# Patient Record
Sex: FEMALE | Race: WHITE | NOT HISPANIC OR LATINO | Employment: STUDENT | URBAN - METROPOLITAN AREA
[De-identification: names, ages, dates, MRNs, and addresses within clinical notes are randomized per-mention and may not be internally consistent; named-entity substitution may affect disease eponyms.]

---
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
## 2022-12-04 ENCOUNTER — HOSPITAL ENCOUNTER (EMERGENCY)
Facility: CLINIC | Age: 22
Discharge: HOME | End: 2022-12-04
Attending: EMERGENCY MEDICINE | Admitting: EMERGENCY MEDICINE
Payer: COMMERCIAL

## 2022-12-04 VITALS
BODY MASS INDEX: 21.63 KG/M2 | WEIGHT: 122.1 LBS | HEART RATE: 77 BPM | HEIGHT: 63 IN | TEMPERATURE: 98.4 F | RESPIRATION RATE: 12 BRPM | SYSTOLIC BLOOD PRESSURE: 101 MMHG | DIASTOLIC BLOOD PRESSURE: 60 MMHG | OXYGEN SATURATION: 100 %

## 2022-12-04 DIAGNOSIS — R10.33 PERIUMBILICAL ABDOMINAL PAIN: ICD-10-CM

## 2022-12-04 LAB
ALBUMIN SERPL-MCNC: 4.2 G/DL (ref 3.4–5)
ALBUMIN UR-MCNC: NEGATIVE MG/DL
ALP SERPL-CCNC: 71 U/L (ref 40–150)
ALT SERPL W P-5'-P-CCNC: 18 U/L (ref 0–50)
ANION GAP SERPL CALCULATED.3IONS-SCNC: 5 MMOL/L (ref 3–14)
APPEARANCE UR: CLEAR
AST SERPL W P-5'-P-CCNC: 14 U/L (ref 0–45)
BASOPHILS # BLD AUTO: 0 10E3/UL (ref 0–0.2)
BASOPHILS NFR BLD AUTO: 0 %
BILIRUB SERPL-MCNC: 0.6 MG/DL (ref 0.2–1.3)
BILIRUB UR QL STRIP: NEGATIVE
BUN SERPL-MCNC: 10 MG/DL (ref 7–30)
CALCIUM SERPL-MCNC: 9.5 MG/DL (ref 8.5–10.1)
CHLORIDE BLD-SCNC: 107 MMOL/L (ref 94–109)
CO2 SERPL-SCNC: 28 MMOL/L (ref 20–32)
COLOR UR AUTO: NORMAL
CREAT SERPL-MCNC: 0.63 MG/DL (ref 0.52–1.04)
EOSINOPHIL # BLD AUTO: 0.1 10E3/UL (ref 0–0.7)
EOSINOPHIL NFR BLD AUTO: 1 %
ERYTHROCYTE [DISTWIDTH] IN BLOOD BY AUTOMATED COUNT: 12 % (ref 10–15)
GFR SERPL CREATININE-BSD FRML MDRD: >90 ML/MIN/1.73M2
GLUCOSE BLD-MCNC: 86 MG/DL (ref 70–99)
GLUCOSE UR STRIP-MCNC: NEGATIVE MG/DL
HCG UR QL: NEGATIVE
HCT VFR BLD AUTO: 38.4 % (ref 35–47)
HGB BLD-MCNC: 13.5 G/DL (ref 11.7–15.7)
HGB UR QL STRIP: NEGATIVE
IMM GRANULOCYTES # BLD: 0 10E3/UL
IMM GRANULOCYTES NFR BLD: 0 %
KETONES UR STRIP-MCNC: NEGATIVE MG/DL
LACTATE SERPL-SCNC: 0.9 MMOL/L (ref 0.7–2)
LEUKOCYTE ESTERASE UR QL STRIP: NEGATIVE
LIPASE SERPL-CCNC: 207 U/L (ref 73–393)
LYMPHOCYTES # BLD AUTO: 4.2 10E3/UL (ref 0.8–5.3)
LYMPHOCYTES NFR BLD AUTO: 45 %
MCH RBC QN AUTO: 30.6 PG (ref 26.5–33)
MCHC RBC AUTO-ENTMCNC: 35.2 G/DL (ref 31.5–36.5)
MCV RBC AUTO: 87 FL (ref 78–100)
MONOCYTES # BLD AUTO: 0.6 10E3/UL (ref 0–1.3)
MONOCYTES NFR BLD AUTO: 7 %
NEUTROPHILS # BLD AUTO: 4.4 10E3/UL (ref 1.6–8.3)
NEUTROPHILS NFR BLD AUTO: 47 %
NITRATE UR QL: NEGATIVE
NRBC # BLD AUTO: 0 10E3/UL
NRBC BLD AUTO-RTO: 0 /100
PH UR STRIP: 5.5 [PH] (ref 5–7)
PLATELET # BLD AUTO: 392 10E3/UL (ref 150–450)
POTASSIUM BLD-SCNC: 3.5 MMOL/L (ref 3.4–5.3)
PROT SERPL-MCNC: 7.8 G/DL (ref 6.8–8.8)
RBC # BLD AUTO: 4.41 10E6/UL (ref 3.8–5.2)
SODIUM SERPL-SCNC: 140 MMOL/L (ref 133–144)
SP GR UR STRIP: 1.01 (ref 1–1.03)
UROBILINOGEN UR STRIP-MCNC: NORMAL MG/DL
WBC # BLD AUTO: 9.4 10E3/UL (ref 4–11)

## 2022-12-04 PROCEDURE — 85025 COMPLETE CBC W/AUTO DIFF WBC: CPT | Performed by: EMERGENCY MEDICINE

## 2022-12-04 PROCEDURE — 99284 EMERGENCY DEPT VISIT MOD MDM: CPT | Performed by: EMERGENCY MEDICINE

## 2022-12-04 PROCEDURE — 83605 ASSAY OF LACTIC ACID: CPT | Performed by: EMERGENCY MEDICINE

## 2022-12-04 PROCEDURE — 80053 COMPREHEN METABOLIC PANEL: CPT | Performed by: EMERGENCY MEDICINE

## 2022-12-04 PROCEDURE — 81025 URINE PREGNANCY TEST: CPT | Performed by: EMERGENCY MEDICINE

## 2022-12-04 PROCEDURE — 82040 ASSAY OF SERUM ALBUMIN: CPT | Performed by: EMERGENCY MEDICINE

## 2022-12-04 PROCEDURE — 258N000003 HC RX IP 258 OP 636: Performed by: EMERGENCY MEDICINE

## 2022-12-04 PROCEDURE — 36415 COLL VENOUS BLD VENIPUNCTURE: CPT | Performed by: EMERGENCY MEDICINE

## 2022-12-04 PROCEDURE — 83690 ASSAY OF LIPASE: CPT | Performed by: EMERGENCY MEDICINE

## 2022-12-04 PROCEDURE — 99283 EMERGENCY DEPT VISIT LOW MDM: CPT | Performed by: EMERGENCY MEDICINE

## 2022-12-04 PROCEDURE — 96360 HYDRATION IV INFUSION INIT: CPT | Performed by: EMERGENCY MEDICINE

## 2022-12-04 PROCEDURE — 81003 URINALYSIS AUTO W/O SCOPE: CPT | Performed by: EMERGENCY MEDICINE

## 2022-12-04 PROCEDURE — 96361 HYDRATE IV INFUSION ADD-ON: CPT | Performed by: EMERGENCY MEDICINE

## 2022-12-04 RX ORDER — ESCITALOPRAM OXALATE 10 MG/1
10 TABLET ORAL DAILY
COMMUNITY
Start: 2022-11-16

## 2022-12-04 RX ORDER — METHYLPHENIDATE HYDROCHLORIDE 18 MG/1
18 TABLET, EXTENDED RELEASE ORAL DAILY
COMMUNITY
Start: 2022-11-28

## 2022-12-04 RX ADMIN — SODIUM CHLORIDE 1000 ML: 9 INJECTION, SOLUTION INTRAVENOUS at 02:27

## 2022-12-04 ASSESSMENT — ACTIVITIES OF DAILY LIVING (ADL)
ADLS_ACUITY_SCORE: 33
ADLS_ACUITY_SCORE: 35
ADLS_ACUITY_SCORE: 35

## 2022-12-04 NOTE — DISCHARGE INSTRUCTIONS
Return to the emergency department for any worsening back pain, abdominal pain, fevers or chills, burning with urination, nausea or vomiting, weakness or any other concerns as given or discussed.

## 2022-12-04 NOTE — ED TRIAGE NOTES
Patient c/o sharp and stabbing pain around the navel. Onset 45 minutes ago. Reported intermittent nausea.      Triage Assessment     Row Name 12/04/22 0040       Triage Assessment (Adult)    Airway WDL WDL       Respiratory WDL    Respiratory WDL WDL       Skin Circulation/Temperature WDL    Skin Circulation/Temperature WDL WDL       Cardiac WDL    Cardiac WDL WDL       Peripheral/Neurovascular WDL    Peripheral Neurovascular WDL WDL       Cognitive/Neuro/Behavioral WDL    Cognitive/Neuro/Behavioral WDL WDL

## 2022-12-04 NOTE — ED PROVIDER NOTES
"    [facility] EMERGENCY DEPARTMENT ([facility])     December 4, 2022    History     Chief Complaint   Patient presents with     Abdominal Pain     HPI  Monse Castillo is a 22 year old female with a past medical history significant for depressive disorder who presents to the Emergency Department for evaluation of abdominal pain.  Patient developed periumbilical pain at approximately midnight.  Associated with one episode of diarrhea preceding the pain.  Had some mild nausea but no vomiting.  No associated fevers chills chest pain shortness of breath.    No dysuria or urgency, no vaginal discharge or bleeding  LMP middle of November  No trauma no falls no recent travel or unusual foods  No similar symptoms previously.  Google suggested appendicitis, which influenced patient decision to come to the ED    Past Medical History  Past Medical History:   Diagnosis Date     Depressive disorder      History reviewed. No pertinent surgical history.  CONCERTA 18 MG CR tablet  escitalopram (LEXAPRO) 10 MG tablet  vortioxetine (TRINTELLIX) 10 MG tablet      Allergies   Allergen Reactions     Grass Extracts [Gramineae Pollens]      Family History  History reviewed. No pertinent family history.  Social History   Social History     Tobacco Use     Smoking status: Never     Smokeless tobacco: Never   Substance Use Topics     Alcohol use: Never     Drug use: Never      Past medical history, past surgical history, medications, allergies, family history, and social history were reviewed with the patient. No additional pertinent items.       Review of Systems  A complete review of systems was performed with pertinent positives and negatives noted in the HPI, and all other systems negative.    Physical Exam   BP: 112/76  Pulse: 62  Temp: 98.4  F (36.9  C)  Resp: 16  Height: 160 cm (5' 3\")  Weight: 55.4 kg (122 lb 1.6 oz)  SpO2: 100 %  Physical Exam  GEN: Well appearing, non toxic, cooperative and conversant.   HEENT: The head is " normocephalic and atraumatic. Pupils are equal round and reactive to light. Extraocular motions are intact. There is no facial swelling. The neck is nontender and supple.   CV: Regular rate and rhythm without murmurs rubs or gallops. 2+ radial pulses bilaterally.  PULM: Clear to auscultation bilaterally.  ABD: Soft, mild diffuse tenderness to palpation though tolerates deep palpation, nondistended.   EXT: Full range of motion.  No edema.  NEURO: Cranial nerves II through XII are intact and symmetric. Bilateral upper and lower extremities grossly show full range of motion without any focal deficits.   PSYCH: Calm and cooperative, interactive.   ED Course      Procedures                     Results for orders placed or performed during the hospital encounter of 12/04/22   UA reflex to Microscopic and Culture     Status: Normal    Specimen: Urine, Clean Catch   Result Value Ref Range    Color Urine Straw Colorless, Straw, Light Yellow, Yellow    Appearance Urine Clear Clear    Glucose Urine Negative Negative mg/dL    Bilirubin Urine Negative Negative    Ketones Urine Negative Negative mg/dL    Specific Gravity Urine 1.010 1.003 - 1.035    Blood Urine Negative Negative    pH Urine 5.5 5.0 - 7.0    Protein Albumin Urine Negative Negative mg/dL    Urobilinogen Urine Normal Normal, 2.0 mg/dL    Nitrite Urine Negative Negative    Leukocyte Esterase Urine Negative Negative    Narrative    Microscopic not indicated   HCG qualitative urine     Status: Normal   Result Value Ref Range    hCG Urine Qualitative Negative Negative   Comprehensive metabolic panel     Status: Normal   Result Value Ref Range    Sodium 140 133 - 144 mmol/L    Potassium 3.5 3.4 - 5.3 mmol/L    Chloride 107 94 - 109 mmol/L    Carbon Dioxide (CO2) 28 20 - 32 mmol/L    Anion Gap 5 3 - 14 mmol/L    Urea Nitrogen 10 7 - 30 mg/dL    Creatinine 0.63 0.52 - 1.04 mg/dL    Calcium 9.5 8.5 - 10.1 mg/dL    Glucose 86 70 - 99 mg/dL    Alkaline Phosphatase 71 40 - 150  U/L    AST 14 0 - 45 U/L    ALT 18 0 - 50 U/L    Protein Total 7.8 6.8 - 8.8 g/dL    Albumin 4.2 3.4 - 5.0 g/dL    Bilirubin Total 0.6 0.2 - 1.3 mg/dL    GFR Estimate >90 >60 mL/min/1.73m2   Lactic acid whole blood     Status: Normal   Result Value Ref Range    Lactic Acid 0.9 0.7 - 2.0 mmol/L   Lipase     Status: Normal   Result Value Ref Range    Lipase 207 73 - 393 U/L   CBC with platelets and differential     Status: None   Result Value Ref Range    WBC Count 9.4 4.0 - 11.0 10e3/uL    RBC Count 4.41 3.80 - 5.20 10e6/uL    Hemoglobin 13.5 11.7 - 15.7 g/dL    Hematocrit 38.4 35.0 - 47.0 %    MCV 87 78 - 100 fL    MCH 30.6 26.5 - 33.0 pg    MCHC 35.2 31.5 - 36.5 g/dL    RDW 12.0 10.0 - 15.0 %    Platelet Count 392 150 - 450 10e3/uL    % Neutrophils 47 %    % Lymphocytes 45 %    % Monocytes 7 %    % Eosinophils 1 %    % Basophils 0 %    % Immature Granulocytes 0 %    NRBCs per 100 WBC 0 <1 /100    Absolute Neutrophils 4.4 1.6 - 8.3 10e3/uL    Absolute Lymphocytes 4.2 0.8 - 5.3 10e3/uL    Absolute Monocytes 0.6 0.0 - 1.3 10e3/uL    Absolute Eosinophils 0.1 0.0 - 0.7 10e3/uL    Absolute Basophils 0.0 0.0 - 0.2 10e3/uL    Absolute Immature Granulocytes 0.0 <=0.4 10e3/uL    Absolute NRBCs 0.0 10e3/uL   CBC with platelets differential     Status: None    Narrative    The following orders were created for panel order CBC with platelets differential.  Procedure                               Abnormality         Status                     ---------                               -----------         ------                     CBC with platelets and d...[[id number]]                      Final result                 Please view results for these tests on the individual orders.     Medications   0.9% sodium chloride BOLUS (0 mLs Intravenous Stopped 12/4/22 5352)        Assessments & Plan (with Medical Decision Making)   22-year-old female presenting with abdominal pain as described    DDx is broad including Pancreatitis, SBO,  appendicitis, colitis/enteritis bacterial or viral, ileitis, abscess formation, psoas abscess, ovarian disease including ovarian torsion and tubo-ovarian abscess, pyelonephritis, urinary tract infection, renal colic, , among other causes.    Clinically very well-appearing and nontoxic  Examination does show diffuse abdominal discomfort but is quite nonfocal and patient does tolerate deep palpation  Urinalysis without evidence of infection  hCG negative  Labs unrevealing    Patient received IV hydration as well as antiemetic resulting in resolution of her symptoms.  She was observed in the emergency department was able to sleep and her symptoms continued to be absent.  Well-appearing appropriate for discharge.    - Patient agrees to our plan and is ready and eager for discharge. Care plan, follow up plan, and reasons to return immediately to the ED were dicussed in detail and summarized as noted in the discharge instructions.      I have reviewed the nursing notes. I have reviewed the findings, diagnosis, plan and need for follow up with the patient.    New Prescriptions    No medications on file       Final diagnoses:   Periumbilical abdominal pain       --  Dileep Barnett MD  [facility] EMERGENCY DEPARTMENT  12/4/2022     Dileep Barnett MD  12/04/22 0516

## 2023-06-12 ENCOUNTER — TELEPHONE (OUTPATIENT)
Dept: PLASTIC SURGERY | Facility: CLINIC | Age: 23
End: 2023-06-12
Payer: COMMERCIAL

## 2023-06-12 NOTE — CONFIDENTIAL NOTE
[facility] :  Care Coordination Note     SITUATION   Monse Castillo (any pronouns) is a 23 year old adult who is receiving support for:  Care Team  .    BACKGROUND     Pt is scheduled for a gender affirming mastectomy consult with Dr. Mcgee on 10/5/23.     ASSESSMENT     Surgery              CGC Assessment  [facility] ([facility] [place] Enrollment: Enrolled  Patient has a therapist: Yes  Name of therapist: Brisa Sawant  Letter of support #1: Requested  Surgery being considered: Yes  Mastectomy: Yes    Pt reports:   No nicotine or other gender affirming surgeries      PLAN          Nursing Interventions:       Follow-up plan:  1. Obtain HALEIGH Almanza

## 2023-09-13 ENCOUNTER — TELEPHONE (OUTPATIENT)
Dept: PLASTIC SURGERY | Facility: CLINIC | Age: 23
End: 2023-09-13
Payer: COMMERCIAL

## 2023-09-13 NOTE — CONFIDENTIAL NOTE
Pt called to inquire how far they would book out if they needed to reschedule 10/5 appt with Dr. Mcgee. Writer stated that next available is Feb. 2023. Pt stated he will keep the appt for now.

## 2023-10-05 ENCOUNTER — OFFICE VISIT (OUTPATIENT)
Dept: PLASTIC SURGERY | Facility: AMBULATORY SURGERY CENTER | Age: 23
End: 2023-10-05
Payer: COMMERCIAL

## 2023-10-05 VITALS
WEIGHT: 129.1 LBS | BODY MASS INDEX: 22.88 KG/M2 | SYSTOLIC BLOOD PRESSURE: 108 MMHG | HEIGHT: 63 IN | DIASTOLIC BLOOD PRESSURE: 60 MMHG

## 2023-10-05 DIAGNOSIS — F64.9 GENDER DYSPHORIA: Primary | ICD-10-CM

## 2023-10-05 PROCEDURE — 99203 OFFICE O/P NEW LOW 30 MIN: CPT | Performed by: PLASTIC SURGERY

## 2023-10-05 NOTE — PROGRESS NOTES
"October 5, 2023    Chief complaint:  Gender dysphoria, consultation for top surgery     History of present illness:  This is a 23 year old patient who comes today for consultation regarding top surgery.  Pronouns they/them.  Patient's name is Monse.  Patient is not taking testosterone.  They have been dealing with gender dysphoria for the last 7 years.  Patient wears binders.  Letter of support is pending.     Past medical history:  Past Medical History:   Diagnosis Date    Depressive disorder      Gender dysphoria     Past surgical history:  Denies     Allergies:  No known drug allergies    Medications:    Current Outpatient Medications:     CONCERTA 18 MG CR tablet, Take 18 mg by mouth daily, Disp: , Rfl:     escitalopram (LEXAPRO) 10 MG tablet, Take 10 mg by mouth daily, Disp: , Rfl:     vortioxetine (TRINTELLIX) 10 MG tablet, Take 15 mg by mouth daily, Disp: , Rfl:     Family history:  Noncontributory     Social History:  Denies tobacco, drinks alcohol socially     Review of systems:  General ROS: No complaints or constitutional symptoms  Skin: No complaints or symptoms   Hematologic/Lymphatic: No symptoms or complaints  Psychiatric: Patient presents with gender dysphoria  Endocrine: No excessive fatigue, no hypermetabolic symptoms reported  Respiratory ROS: No cough, shortness of breath, or wheezing  Cardiovascular ROS: No chest pain or dyspnea on exertion  Breast ROS: Denies nipple discharge, denies palpable breast masses, denies peau d'orange.  Gastrointestinal ROS: No abdominal pain, nausea, diarrhea, or constipation  Musculoskeletal ROS: No recent injuries reported  Neurological ROS: No focal neurologic defects reported.       Physical exam:    /60   Ht 1.6 m (5' 3\")   Wt 58.6 kg (129 lb 1.6 oz)   BMI 22.87 kg/m    General: Alert, cooperative, appears stated age   Skin: Skin color, texture, turgor normal, no rashes or lesions   Lymphatic: No obvious adenopathy, no swelling   Eyes: No scleral " "icterus, pupils equal  HENT: No traumatic injury to the head or face, no gross abnormalities  Lungs: Normal respiratory effort, breath sounds equal bilaterally  Heart: Regular rate and rhythm  Breasts: Bilateral grade 2 ptosis.  No nipple inversion, no obvious nipple discharge, no obvious palpable breast masses, no peau d'orange.  No palpable axillary lymphadenopathy bilaterally.  Abdomen: Soft, non-distended and non-tender to palpation  Neurologic: Grossly intact           Assessment:     23 year old patient history of gender dysphoria.     PLAN:      I believe that Monse is a good candidate for gender affirming top surgery with one of the following options: Bilateral simple mastectomies versus bilateral breast reduction with possible nipple graft reconstruction. Patient desires NO nipple reconstruction.     \"According to [place] case law and [facility] standards of care, with an appropriate letter of support from a mental health provider, top surgery/mastectomy is medically necessary for the treatment of gender dysphoria.\"     I discussed with Monse the risks of surgery and they include but are not limited to scarring, keloid formation, infection, bleeding, hematoma, seroma, contour deformities, lack of sensation on the chest, the fact that no nipple graft will be offered, need for further surgeries.     Patient did acknowledge all of these risks and has agreed to proceed.     We will obtain a letter of support from their therapist and then we will schedule for surgery: bilateral simple mastectomies with NO nipple grafting.     Time spent with the patient 30 minutes.    Nehemiah Mcgee MD , FACS   Diplomate American Board of Plastic Surgery  Diplomate American Board of Surgery  Adj. Assistant Professor of Surgery  Division of Plastic & Reconstructive Surgery   [facility]  Office: [phone]   10/5/2023 at 2:13 PM   "

## 2023-10-05 NOTE — LETTER
10/5/2023         RE: Monse Castillo  [address]  [place] [address]        Dear Colleague,    Thank you for referring your patient, Monse Castillo, to the [facility] [place]. Please see a copy of my visit note below.    October 5, 2023    Chief complaint:  Gender dysphoria, consultation for top surgery     History of present illness:  This is a 23 year old patient who comes today for consultation regarding top surgery.  Pronouns they/them.  Patient's name is Monse.  Patient is not taking testosterone.  They have been dealing with gender dysphoria for the last 7 years.  Patient wears binders.  Letter of support is pending.     Past medical history:  Past Medical History:   Diagnosis Date     Depressive disorder      Gender dysphoria     Past surgical history:  Denies     Allergies:  No known drug allergies    Medications:    Current Outpatient Medications:      CONCERTA 18 MG CR tablet, Take 18 mg by mouth daily, Disp: , Rfl:      escitalopram (LEXAPRO) 10 MG tablet, Take 10 mg by mouth daily, Disp: , Rfl:      vortioxetine (TRINTELLIX) 10 MG tablet, Take 15 mg by mouth daily, Disp: , Rfl:     Family history:  Noncontributory     Social History:  Denies tobacco, drinks alcohol socially     Review of systems:  General ROS: No complaints or constitutional symptoms  Skin: No complaints or symptoms   Hematologic/Lymphatic: No symptoms or complaints  Psychiatric: Patient presents with gender dysphoria  Endocrine: No excessive fatigue, no hypermetabolic symptoms reported  Respiratory ROS: No cough, shortness of breath, or wheezing  Cardiovascular ROS: No chest pain or dyspnea on exertion  Breast ROS: Denies nipple discharge, denies palpable breast masses, denies peau d'orange.  Gastrointestinal ROS: No abdominal pain, nausea, diarrhea, or constipation  Musculoskeletal ROS: No recent injuries reported  Neurological ROS: No focal neurologic defects reported.      "  Physical exam:    /60   Ht 1.6 m (5' 3\")   Wt 58.6 kg (129 lb 1.6 oz)   BMI 22.87 kg/m    General: Alert, cooperative, appears stated age   Skin: Skin color, texture, turgor normal, no rashes or lesions   Lymphatic: No obvious adenopathy, no swelling   Eyes: No scleral icterus, pupils equal  HENT: No traumatic injury to the head or face, no gross abnormalities  Lungs: Normal respiratory effort, breath sounds equal bilaterally  Heart: Regular rate and rhythm  Breasts: Bilateral grade 2 ptosis.  No nipple inversion, no obvious nipple discharge, no obvious palpable breast masses, no peau d'orange.  No palpable axillary lymphadenopathy bilaterally.  Abdomen: Soft, non-distended and non-tender to palpation  Neurologic: Grossly intact           Assessment:     23 year old patient history of gender dysphoria.     PLAN:      I believe that Monse is a good candidate for gender affirming top surgery with one of the following options: Bilateral simple mastectomies versus bilateral breast reduction with possible nipple graft reconstruction. Patient desires NO nipple reconstruction.     \"According to [place] case law and [facility] standards of care, with an appropriate letter of support from a mental health provider, top surgery/mastectomy is medically necessary for the treatment of gender dysphoria.\"     I discussed with Monse the risks of surgery and they include but are not limited to scarring, keloid formation, infection, bleeding, hematoma, seroma, contour deformities, lack of sensation on the chest, the fact that no nipple graft will be offered, need for further surgeries.     Patient did acknowledge all of these risks and has agreed to proceed.     We will obtain a letter of support from their therapist and then we will schedule for surgery: bilateral simple mastectomies with NO nipple grafting.     Time spent with the patient 30 minutes.    Nehemiah Mcgee MD , FACS   Diplomate American Board of Plastic " Surgery  Diplomate American Board of Surgery  Adj. Assistant Professor of Surgery  Division of Plastic & Reconstructive Surgery   [facility]  Office: [phone]   10/5/2023 at 2:13 PM       Again, thank you for allowing me to participate in the care of your patient.        Sincerely,        Nehemiah Mcgee MD

## 2023-10-05 NOTE — NURSING NOTE
Patient here today for top surgery consult. The patient does not have a LOS but is working to get one. I have encouraged the patient to upload the LOS via [facility] once they have one so we can move forward with scheduling.     Claribel Gomez RN on 10/5/2023 at 3:00 PM

## 2023-10-22 ENCOUNTER — HEALTH MAINTENANCE LETTER (OUTPATIENT)
Age: 23
End: 2023-10-22

## 2023-11-17 ENCOUNTER — OFFICE VISIT (OUTPATIENT)
Dept: URGENT CARE | Facility: URGENT CARE | Age: 23
End: 2023-11-17
Payer: COMMERCIAL

## 2023-11-17 VITALS
HEART RATE: 86 BPM | SYSTOLIC BLOOD PRESSURE: 123 MMHG | HEIGHT: 64 IN | OXYGEN SATURATION: 98 % | WEIGHT: 130 LBS | BODY MASS INDEX: 22.2 KG/M2 | TEMPERATURE: 98.4 F | DIASTOLIC BLOOD PRESSURE: 76 MMHG

## 2023-11-17 DIAGNOSIS — K64.4 EXTERNAL HEMORRHOIDS: ICD-10-CM

## 2023-11-17 DIAGNOSIS — K62.5 PAINLESS RECTAL BLEEDING: Primary | ICD-10-CM

## 2023-11-17 PROBLEM — F41.9 ANXIETY: Status: ACTIVE | Noted: 2017-02-02

## 2023-11-17 LAB
ERYTHROCYTE [DISTWIDTH] IN BLOOD BY AUTOMATED COUNT: 12.2 % (ref 10–15)
HCT VFR BLD AUTO: 37.9 % (ref 35–53)
HEMOCCULT STL QL: NEGATIVE
HGB BLD-MCNC: 13 G/DL (ref 11.7–17.7)
MCH RBC QN AUTO: 30.4 PG (ref 26.5–33)
MCHC RBC AUTO-ENTMCNC: 34.3 G/DL (ref 31.5–36.5)
MCV RBC AUTO: 89 FL (ref 78–100)
PLATELET # BLD AUTO: 400 10E3/UL (ref 150–450)
RBC # BLD AUTO: 4.28 10E6/UL (ref 3.8–5.9)
WBC # BLD AUTO: 5.4 10E3/UL (ref 4–11)

## 2023-11-17 PROCEDURE — 82272 OCCULT BLD FECES 1-3 TESTS: CPT | Performed by: STUDENT IN AN ORGANIZED HEALTH CARE EDUCATION/TRAINING PROGRAM

## 2023-11-17 PROCEDURE — 99203 OFFICE O/P NEW LOW 30 MIN: CPT | Performed by: STUDENT IN AN ORGANIZED HEALTH CARE EDUCATION/TRAINING PROGRAM

## 2023-11-17 PROCEDURE — 85027 COMPLETE CBC AUTOMATED: CPT | Performed by: STUDENT IN AN ORGANIZED HEALTH CARE EDUCATION/TRAINING PROGRAM

## 2023-11-17 PROCEDURE — 36415 COLL VENOUS BLD VENIPUNCTURE: CPT | Performed by: STUDENT IN AN ORGANIZED HEALTH CARE EDUCATION/TRAINING PROGRAM

## 2023-11-17 RX ORDER — HYDROCORTISONE 25 MG/G
CREAM TOPICAL 2 TIMES DAILY PRN
Qty: 28 G | Refills: 0 | Status: SHIPPED | OUTPATIENT
Start: 2023-11-17 | End: 2023-11-24

## 2023-11-17 NOTE — PROGRESS NOTES
Assessment & Plan     Painless rectal bleeding  External hemorrhoids  Exam consistent with nonthrombosed external hemorrhoids.  Hemoccult negative reassuring against internal GI bleed.  Family history of Crohn's disease however Monse does not have abdominal pain, diarrhea, recent weight loss.  No signs of hypovolemia and HgB stable at 13.0 compared to last labs. Discussed symptom management with Monse including Anusol twice daily for 7 days, dietary changes, symptom management.  They were understanding and in agreement of the plan at the time of discharge.  - Follow-up with PCP if no improvement of symptoms in 2 weeks  - Present to emergency department if any lightheadedness or dizziness concerning for acute blood loss  - Occult blood stool  - CBC with platelets  - hydrocortisone, Perianal, (HYDROCORTISONE) 2.5 % cream  Dispense: 28 g; Refill: 0    Return for In clinic with your primary care provider.    Charmaine Negrete, DO  she/her  [facility]    Subjective     Monse Castillo is a 23 year old adult who presents to clinic today for the following health issues:    HPI  12 days ago, wiped and there was blood on the toilet paper/in the toilet bowl.   Since then, have had blood with every BM. Two night ago, more blood with a clump  Bright red blood, painless  No abdominal pain, cramping, fever, vomiting, hematemesis  Passing gas normally   Normal BM: daily, reg 4 on bristol   Not vaginal, currently menstruating  No recent dietary changes- some loss of appetite,   No medication changes in the last year  No anal penetration  Crohn's disease runs in family (grandfather's side of family)  No h/o internal or external hemorrhoids    Past Medical History:   Diagnosis Date    Depressive disorder      Allergies   Allergen Reactions    Grass Extracts [Gramineae Pollens]      Current Outpatient Medications   Medication    CONCERTA 18 MG CR tablet    escitalopram (LEXAPRO) 10 MG tablet    hydrocortisone,  "Perianal, (HYDROCORTISONE) 2.5 % cream    vortioxetine (TRINTELLIX) 10 MG tablet     No current facility-administered medications for this visit.      Review of Systems  Constitutional, HEENT, cardiovascular, pulmonary, gi and gu systems are negative, except as otherwise noted.      Objective    /76   Pulse 86   Temp 98.4  F (36.9  C) (Oral)   Ht 1.613 m (5' 3.5\")   Wt 59 kg (130 lb)   LMP 11/01/2023   SpO2 98%   BMI 22.67 kg/m      Physical Exam   GENERAL: healthy, alert and no distress   (female): normal female external genitalia  RECTAL (female): normal sphincter tone, no rectal masses and two small external non-thrombosed hemorrhoid    Results for orders placed or performed in visit on 11/17/23   Occult blood stool     Status: Normal   Result Value Ref Range    Occult Blood Negative Negative           The use of Dragon/Cognotion dictation services may have been used to construct the content in this note; any grammatical or spelling errors are non-intentional. Please contact the author of this note directly if you are in need of any clarification.  "

## 2023-11-17 NOTE — PATIENT INSTRUCTIONS
If you notice any lightheadedness, dizziness that does not resolve with rest or oral intake, please present to ED    If symptoms do not improve with lifestyle changes and medication, please follow up with PCP    I recommend a high-fiber diet, increase fluids, limiting caffeine and alcohol.  The less you can strain with BMs the better.  You can try witch hazel pads or sitz baths twice daily.  I am giving you Anusol ointment to use twice daily for 7 days.

## 2024-02-02 ENCOUNTER — DOCUMENTATION ONLY (OUTPATIENT)
Dept: PLASTIC SURGERY | Facility: CLINIC | Age: 24
End: 2024-02-02
Payer: COMMERCIAL

## 2024-02-02 NOTE — PROGRESS NOTES
[facility] :  Care Coordination Note     SITUATION   Monse Castillo  is a 23 year old adult who is receiving support for:  Care Team  .    BACKGROUND     LOS received and may need updates. Pt insurance is not on file. Sent message to pt to confirm current insurance.    3/20/24  Updated LOS reviewed and meets [employer] plus policy. Updated pt list. Sent message to pt with update and requested pt upload front and back of new insurance card. Sent message to RNCC to request CR.    ASSESSMENT     Surgery              CGC Assessment  [facility] ([facility]) Enrollment: Enrolled  Patient has a therapist: Yes  Name of therapist: Brisa Sawant  Letter of support #1: Received  Letter #1 Date: 02/18/24  Surgery being considered: Yes  Mastectomy: Yes      PLAN          Nursing Interventions:       Follow-up plan:  Pt will upload insurance info. Surgeon will place CR.       FREDA Bravo

## 2024-03-21 ENCOUNTER — TELEPHONE (OUTPATIENT)
Dept: PLASTIC SURGERY | Facility: AMBULATORY SURGERY CENTER | Age: 24
End: 2024-03-21
Payer: COMMERCIAL

## 2024-03-21 PROBLEM — F64.9 GENDER DYSPHORIA: Status: ACTIVE | Noted: 2023-10-05

## 2024-05-13 ENCOUNTER — ANESTHESIA EVENT (OUTPATIENT)
Dept: SURGERY | Facility: AMBULATORY SURGERY CENTER | Age: 24
End: 2024-05-13
Payer: COMMERCIAL

## 2024-05-13 NOTE — ANESTHESIA PREPROCEDURE EVALUATION
"Anesthesia Pre-Procedure Evaluation    Patient: Monse Castillo   MRN: [id number] : 2000        Procedure : Procedure(s):  MASTECTOMY, SIMPLE BILATERAL          Past Medical History:   Diagnosis Date    Depressive disorder       No past surgical history on file.   Allergies   Allergen Reactions    Grass Extracts [Gramineae Pollens]       Social History     Tobacco Use    Smoking status: Never    Smokeless tobacco: Never   Substance Use Topics    Alcohol use: Never      Wt Readings from Last 1 Encounters:   23 59 kg (130 lb)           Physical Exam    Airway        Mallampati: I   TM distance: > 3 FB   Neck ROM: full   Mouth opening: > 3 cm    Respiratory Devices and Support         Dental       (+) Minor Abnormalities - some fillings, tiny chips      Cardiovascular   cardiovascular exam normal          Pulmonary   pulmonary exam normal                OUTSIDE LABS:  CBC:   Lab Results   Component Value Date    WBC 5.4 2023    WBC 9.4 2022    HGB 13.0 2023    HGB 13.5 2022    HCT 37.9 2023    HCT 38.4 2022     2023     2022     BMP:   Lab Results   Component Value Date     2022    POTASSIUM 3.5 2022    CHLORIDE 107 2022    CO2 28 2022    BUN 10 2022    CR 0.63 2022    GLC 86 2022     COAGS: No results found for: \"PTT\", \"INR\", \"FIBR\"  POC:   Lab Results   Component Value Date    HCG Negative 2022     HEPATIC:   Lab Results   Component Value Date    ALBUMIN 4.2 2022    PROTTOTAL 7.8 2022    ALT 18 2022    AST 14 2022    ALKPHOS 71 2022    BILITOTAL 0.6 2022     OTHER:   Lab Results   Component Value Date    LACT 0.9 2022    MARCELLA 9.5 2022    LIPASE 207 2022       Anesthesia Plan    ASA Status:  2    NPO Status:  NPO Appropriate    Anesthesia Type: General.     - Airway: LMA   Induction: Intravenous, Propofol.   Maintenance: Balanced.    " High Risk Follow up:  Worker called patient [phone] and left a message for call back on SW phone.     Consents    Anesthesia Plan(s) and associated risks, benefits, and realistic alternatives discussed. Questions answered and patient/representative(s) expressed understanding.     - Discussed: Risks, Benefits and Alternatives for BOTH SEDATION and the PROCEDURE were discussed     - Discussed with:  Patient, Parent (Mother and/or Father)      - Extended Intubation/Ventilatory Support Discussed: No.      - Patient is DNR/DNI Status: No     Use of blood products discussed: No .     Postoperative Care    Pain management: IV analgesics, Oral pain medications, Multi-modal analgesia.   PONV prophylaxis: Dexamethasone or Solumedrol, Ondansetron (or other 5HT-3), Background Propofol Infusion     Comments:               Nicanor Mcdowell MD    I have reviewed the pertinent notes and labs in the chart from the past 30 days and (re)examined the patient.  Any updates or changes from those notes are reflected in this note.

## 2024-05-14 ENCOUNTER — HOSPITAL ENCOUNTER (OUTPATIENT)
Facility: AMBULATORY SURGERY CENTER | Age: 24
Discharge: HOME | End: 2024-05-14
Attending: PLASTIC SURGERY | Admitting: PLASTIC SURGERY
Payer: COMMERCIAL

## 2024-05-14 ENCOUNTER — ANESTHESIA (OUTPATIENT)
Dept: SURGERY | Facility: AMBULATORY SURGERY CENTER | Age: 24
End: 2024-05-14
Payer: COMMERCIAL

## 2024-05-14 VITALS
OXYGEN SATURATION: 97 % | WEIGHT: 130 LBS | DIASTOLIC BLOOD PRESSURE: 78 MMHG | TEMPERATURE: 98.1 F | HEIGHT: 63 IN | RESPIRATION RATE: 16 BRPM | SYSTOLIC BLOOD PRESSURE: 121 MMHG | BODY MASS INDEX: 23.04 KG/M2 | HEART RATE: 89 BPM

## 2024-05-14 DIAGNOSIS — Z90.13 S/P BILATERAL MASTECTOMY: Primary | ICD-10-CM

## 2024-05-14 LAB
HCG UR QL: NEGATIVE
INTERNAL QC OK POCT: NORMAL
POCT KIT EXPIRATION DATE: NORMAL
POCT KIT LOT NUMBER: NORMAL

## 2024-05-14 PROCEDURE — 88307 TISSUE EXAM BY PATHOLOGIST: CPT | Mod: 26 | Performed by: PATHOLOGY

## 2024-05-14 PROCEDURE — 88305 TISSUE EXAM BY PATHOLOGIST: CPT | Mod: TC | Performed by: PLASTIC SURGERY

## 2024-05-14 PROCEDURE — 19303 MAST SIMPLE COMPLETE: CPT | Mod: 50

## 2024-05-14 PROCEDURE — 19303 MAST SIMPLE COMPLETE: CPT | Mod: 50 | Performed by: PLASTIC SURGERY

## 2024-05-14 PROCEDURE — 81025 URINE PREGNANCY TEST: CPT | Performed by: PATHOLOGY

## 2024-05-14 PROCEDURE — 19303 MAST SIMPLE COMPLETE: CPT | Performed by: STUDENT IN AN ORGANIZED HEALTH CARE EDUCATION/TRAINING PROGRAM

## 2024-05-14 PROCEDURE — 19303 MAST SIMPLE COMPLETE: CPT | Performed by: NURSE ANESTHETIST, CERTIFIED REGISTERED

## 2024-05-14 RX ORDER — ONDANSETRON 2 MG/ML
INJECTION INTRAMUSCULAR; INTRAVENOUS PRN
Status: DISCONTINUED | OUTPATIENT
Start: 2024-05-14 | End: 2024-05-14

## 2024-05-14 RX ORDER — ONDANSETRON 4 MG/1
4 TABLET, ORALLY DISINTEGRATING ORAL EVERY 30 MIN PRN
Status: DISCONTINUED | OUTPATIENT
Start: 2024-05-14 | End: 2024-05-14 | Stop reason: HOSPADM

## 2024-05-14 RX ORDER — DEXAMETHASONE SODIUM PHOSPHATE 10 MG/ML
4 INJECTION, SOLUTION INTRAMUSCULAR; INTRAVENOUS
Status: DISCONTINUED | OUTPATIENT
Start: 2024-05-14 | End: 2024-05-14 | Stop reason: HOSPADM

## 2024-05-14 RX ORDER — OXYCODONE HYDROCHLORIDE 5 MG/1
5 TABLET ORAL
Status: DISCONTINUED | OUTPATIENT
Start: 2024-05-14 | End: 2024-05-15 | Stop reason: HOSPADM

## 2024-05-14 RX ORDER — FENTANYL CITRATE 50 UG/ML
25 INJECTION, SOLUTION INTRAMUSCULAR; INTRAVENOUS EVERY 5 MIN PRN
Status: DISCONTINUED | OUTPATIENT
Start: 2024-05-14 | End: 2024-05-14 | Stop reason: HOSPADM

## 2024-05-14 RX ORDER — SODIUM CHLORIDE, SODIUM LACTATE, POTASSIUM CHLORIDE, CALCIUM CHLORIDE 600; 310; 30; 20 MG/100ML; MG/100ML; MG/100ML; MG/100ML
INJECTION, SOLUTION INTRAVENOUS CONTINUOUS
Status: DISCONTINUED | OUTPATIENT
Start: 2024-05-14 | End: 2024-05-14 | Stop reason: HOSPADM

## 2024-05-14 RX ORDER — HYDROCODONE BITARTRATE AND ACETAMINOPHEN 5; 325 MG/1; MG/1
1-2 TABLET ORAL EVERY 4 HOURS PRN
Qty: 25 TABLET | Refills: 0 | Status: SHIPPED | OUTPATIENT
Start: 2024-05-14

## 2024-05-14 RX ORDER — LIDOCAINE HYDROCHLORIDE 20 MG/ML
INJECTION, SOLUTION INFILTRATION; PERINEURAL PRN
Status: DISCONTINUED | OUTPATIENT
Start: 2024-05-14 | End: 2024-05-14

## 2024-05-14 RX ORDER — LIDOCAINE 40 MG/G
CREAM TOPICAL
Status: DISCONTINUED | OUTPATIENT
Start: 2024-05-14 | End: 2024-05-14 | Stop reason: HOSPADM

## 2024-05-14 RX ORDER — SODIUM CHLORIDE, SODIUM LACTATE, POTASSIUM CHLORIDE, CALCIUM CHLORIDE 600; 310; 30; 20 MG/100ML; MG/100ML; MG/100ML; MG/100ML
INJECTION, SOLUTION INTRAVENOUS CONTINUOUS PRN
Status: DISCONTINUED | OUTPATIENT
Start: 2024-05-14 | End: 2024-05-14

## 2024-05-14 RX ORDER — ONDANSETRON 2 MG/ML
4 INJECTION INTRAMUSCULAR; INTRAVENOUS EVERY 30 MIN PRN
Status: DISCONTINUED | OUTPATIENT
Start: 2024-05-14 | End: 2024-05-14 | Stop reason: HOSPADM

## 2024-05-14 RX ORDER — CEFAZOLIN SODIUM 2 G/50ML
2 SOLUTION INTRAVENOUS
Status: COMPLETED | OUTPATIENT
Start: 2024-05-14 | End: 2024-05-14

## 2024-05-14 RX ORDER — OXYCODONE HYDROCHLORIDE 5 MG/1
10 TABLET ORAL
Status: DISCONTINUED | OUTPATIENT
Start: 2024-05-14 | End: 2024-05-15 | Stop reason: HOSPADM

## 2024-05-14 RX ORDER — HYDROMORPHONE HYDROCHLORIDE 1 MG/ML
0.2 INJECTION, SOLUTION INTRAMUSCULAR; INTRAVENOUS; SUBCUTANEOUS EVERY 5 MIN PRN
Status: DISCONTINUED | OUTPATIENT
Start: 2024-05-14 | End: 2024-05-14 | Stop reason: HOSPADM

## 2024-05-14 RX ORDER — FENTANYL CITRATE 50 UG/ML
INJECTION, SOLUTION INTRAMUSCULAR; INTRAVENOUS PRN
Status: DISCONTINUED | OUTPATIENT
Start: 2024-05-14 | End: 2024-05-14

## 2024-05-14 RX ORDER — NALOXONE HYDROCHLORIDE 0.4 MG/ML
0.1 INJECTION, SOLUTION INTRAMUSCULAR; INTRAVENOUS; SUBCUTANEOUS
Status: DISCONTINUED | OUTPATIENT
Start: 2024-05-14 | End: 2024-05-15 | Stop reason: HOSPADM

## 2024-05-14 RX ORDER — PROPOFOL 10 MG/ML
INJECTION, EMULSION INTRAVENOUS CONTINUOUS PRN
Status: DISCONTINUED | OUTPATIENT
Start: 2024-05-14 | End: 2024-05-14

## 2024-05-14 RX ORDER — HYDROMORPHONE HYDROCHLORIDE 1 MG/ML
0.4 INJECTION, SOLUTION INTRAMUSCULAR; INTRAVENOUS; SUBCUTANEOUS EVERY 5 MIN PRN
Status: DISCONTINUED | OUTPATIENT
Start: 2024-05-14 | End: 2024-05-14 | Stop reason: HOSPADM

## 2024-05-14 RX ORDER — DEXAMETHASONE SODIUM PHOSPHATE 4 MG/ML
INJECTION, SOLUTION INTRA-ARTICULAR; INTRALESIONAL; INTRAMUSCULAR; INTRAVENOUS; SOFT TISSUE PRN
Status: DISCONTINUED | OUTPATIENT
Start: 2024-05-14 | End: 2024-05-14

## 2024-05-14 RX ORDER — DEXAMETHASONE SODIUM PHOSPHATE 10 MG/ML
4 INJECTION, SOLUTION INTRAMUSCULAR; INTRAVENOUS
Status: DISCONTINUED | OUTPATIENT
Start: 2024-05-14 | End: 2024-05-15 | Stop reason: HOSPADM

## 2024-05-14 RX ORDER — ONDANSETRON 2 MG/ML
4 INJECTION INTRAMUSCULAR; INTRAVENOUS EVERY 30 MIN PRN
Status: DISCONTINUED | OUTPATIENT
Start: 2024-05-14 | End: 2024-05-15 | Stop reason: HOSPADM

## 2024-05-14 RX ORDER — NALOXONE HYDROCHLORIDE 0.4 MG/ML
0.1 INJECTION, SOLUTION INTRAMUSCULAR; INTRAVENOUS; SUBCUTANEOUS
Status: DISCONTINUED | OUTPATIENT
Start: 2024-05-14 | End: 2024-05-14 | Stop reason: HOSPADM

## 2024-05-14 RX ORDER — ONDANSETRON 4 MG/1
4 TABLET, ORALLY DISINTEGRATING ORAL EVERY 6 HOURS PRN
Qty: 16 TABLET | Refills: 0 | Status: SHIPPED | OUTPATIENT
Start: 2024-05-14

## 2024-05-14 RX ORDER — AMOXICILLIN 250 MG
1-2 CAPSULE ORAL 2 TIMES DAILY
Qty: 30 TABLET | Refills: 0 | Status: SHIPPED | OUTPATIENT
Start: 2024-05-14

## 2024-05-14 RX ORDER — PROPOFOL 10 MG/ML
INJECTION, EMULSION INTRAVENOUS PRN
Status: DISCONTINUED | OUTPATIENT
Start: 2024-05-14 | End: 2024-05-14

## 2024-05-14 RX ORDER — ONDANSETRON 4 MG/1
4 TABLET, ORALLY DISINTEGRATING ORAL EVERY 30 MIN PRN
Status: DISCONTINUED | OUTPATIENT
Start: 2024-05-14 | End: 2024-05-15 | Stop reason: HOSPADM

## 2024-05-14 RX ORDER — CEPHALEXIN 500 MG/1
500 CAPSULE ORAL 3 TIMES DAILY
Qty: 9 CAPSULE | Refills: 0 | Status: SHIPPED | OUTPATIENT
Start: 2024-05-14 | End: 2024-05-17

## 2024-05-14 RX ORDER — FENTANYL CITRATE 50 UG/ML
50 INJECTION, SOLUTION INTRAMUSCULAR; INTRAVENOUS EVERY 5 MIN PRN
Status: DISCONTINUED | OUTPATIENT
Start: 2024-05-14 | End: 2024-05-14 | Stop reason: HOSPADM

## 2024-05-14 RX ORDER — GLYCOPYRROLATE 0.2 MG/ML
INJECTION, SOLUTION INTRAMUSCULAR; INTRAVENOUS PRN
Status: DISCONTINUED | OUTPATIENT
Start: 2024-05-14 | End: 2024-05-14

## 2024-05-14 RX ADMIN — Medication 0.5 MG: at 08:30

## 2024-05-14 RX ADMIN — DEXAMETHASONE SODIUM PHOSPHATE 4 MG: 4 INJECTION, SOLUTION INTRA-ARTICULAR; INTRALESIONAL; INTRAMUSCULAR; INTRAVENOUS; SOFT TISSUE at 08:30

## 2024-05-14 RX ADMIN — ONDANSETRON 4 MG: 2 INJECTION INTRAMUSCULAR; INTRAVENOUS at 10:59

## 2024-05-14 RX ADMIN — PROPOFOL 200 MCG/KG/MIN: 10 INJECTION, EMULSION INTRAVENOUS at 08:59

## 2024-05-14 RX ADMIN — FENTANYL CITRATE 50 MCG: 50 INJECTION, SOLUTION INTRAMUSCULAR; INTRAVENOUS at 09:01

## 2024-05-14 RX ADMIN — LIDOCAINE HYDROCHLORIDE 100 MG: 20 INJECTION, SOLUTION INFILTRATION; PERINEURAL at 08:11

## 2024-05-14 RX ADMIN — CEFAZOLIN SODIUM 2 G: 2 SOLUTION INTRAVENOUS at 08:01

## 2024-05-14 RX ADMIN — GLYCOPYRROLATE 0.2 MG: 0.2 INJECTION, SOLUTION INTRAMUSCULAR; INTRAVENOUS at 08:30

## 2024-05-14 RX ADMIN — SODIUM CHLORIDE, SODIUM LACTATE, POTASSIUM CHLORIDE, CALCIUM CHLORIDE: 600; 310; 30; 20 INJECTION, SOLUTION INTRAVENOUS at 07:40

## 2024-05-14 RX ADMIN — PROPOFOL 150 MG: 10 INJECTION, EMULSION INTRAVENOUS at 08:11

## 2024-05-14 RX ADMIN — PROPOFOL 150 MCG/KG/MIN: 10 INJECTION, EMULSION INTRAVENOUS at 10:40

## 2024-05-14 RX ADMIN — PROPOFOL 200 MCG/KG/MIN: 10 INJECTION, EMULSION INTRAVENOUS at 08:13

## 2024-05-14 RX ADMIN — SODIUM CHLORIDE, SODIUM LACTATE, POTASSIUM CHLORIDE, CALCIUM CHLORIDE: 600; 310; 30; 20 INJECTION, SOLUTION INTRAVENOUS at 11:00

## 2024-05-14 RX ADMIN — Medication 0.5 MG: at 09:41

## 2024-05-14 RX ADMIN — PROPOFOL 200 MCG/KG/MIN: 10 INJECTION, EMULSION INTRAVENOUS at 09:49

## 2024-05-14 RX ADMIN — FENTANYL CITRATE 50 MCG: 50 INJECTION, SOLUTION INTRAMUSCULAR; INTRAVENOUS at 08:10

## 2024-05-14 RX ADMIN — PROPOFOL 50 MG: 10 INJECTION, EMULSION INTRAVENOUS at 09:01

## 2024-05-14 NOTE — OP NOTE
May 14, 2024    Monse Castillo    Preoperative diagnosis: gender dysphoria.     Postoperative diagnosis: same.     Procedure: Bilateral simple mastectomy with NO nipple grafting.      Surgeon: Nehemiah Mcgee MD      Assistant: Wally Hernandez MD, plastic surgery resident     Anesthesia: General.     Estimated blood loss:  20 mL.     Intravenous fluid:  1000 mL.     Tumescent solution: 300 mL per breast for a total of 600 mL (from 1,000 mL of Lactated Ringer and 1 mg of Epinephrine).     Urine output: 250 mL.     Findings: macroscopically normal appearing breasts     Specimens: right breast 642 grams, left breast  730 grams.     Drains: two # 15 FR John drains. One per side.     Complications: none.     Disposition: Patient tolerated procedure well and was extubated and transferred to recovery room awake and in stable condition.     Indication:     This is a 24 year old patient with diagnosis of gender dysphoria.  The patient met WPATH and insurance criteria for gender affirming top surgery.  Patient did NOT wish to preserve nipple areolar complexes, hence, nipple grafting was NOT offered.  Risks were explained to the patient and they include but are not limited to scarring, infection, keloid formation, hematoma, seroma, contour irregularities, guarantee permanent anesthesia at the level of bilateral mastectomy areas.  Patient acknowledged all these risks and agreed to proceed.     Description of procedure:     The patient was seen in the preoperative holding area and preoperative markings were drawn on the chest.  First, the midline was drawn, followed by drawing of the impression of the inferior edge of bilateral pectoralis major muscles on the chest's skin.  I also marked the lateral border of the pectoralis major muscle, from the anterior axillary line to the 9 o'clock position on the nipple areolar complex. I also marked the inframammary fold (IMF) bilaterally.     This is how the markings looked like on the  patient:                   Preoperative IV antibiotics were given to the patient and the patient was then brought to the operating room and was placed supine on the operating room table.  General endotracheal anesthesia was then obtained.  Glover catheter was introduced. The patient already had sequential compression devices on the lower extremities. Thighs and forelegs were supported with pillows and secured with padded safety straps.  The arms were secured to arm boards at 90 degree angles from the table using Ace wraps.  Lower demetrio hugger was in place.  The patient was then put into a sitting position and adjustments were made for necessary symmetry.     Then the chest and breast area was prepped and draped in a sterile surgical fashion using ChloraPrep.       After timeout, bilateral stab incisions were made with scalpel # 15 on the outer lower quadrant of each breast and tumescent solution with a Klein's infiltrating canula was infiltrated in the sub glandular/supra-aponeurotic space. This will later facilitate dissection and hemostasis.    Then, we make incision on the skin markings for the inferior edge of the pectoralis major muscle.  Dissection with electrocautery was carried down to the capsule of bilateral breasts.  Essentially, the plane for dissection was between the subcutaneous fat and the breast capsule.  This dissection continued cranially towards the clavicles until the superior border of the breast parenchyma was visualized.  With this dissection, we were able to develop the superior mastectomy flap bilaterally.    We also dissected the space from the superior border of the breast parenchyma until the inferior edge of the clavicle.  This will later facilitate tension-free closure of the superior mastectomy flap with the lower mastectomy flap.     The breast mound was elevated from the pectoralis major muscle fascia and undermined inferiorly towards the IMF, medially towards parasternal border but  staying 2 cm laterally from the midline, and finally laterally, past the lateral border of the pectoralis major muscle, incorporating the axillary tail bilaterally. Inferiorly, the IMF was obliterated and we dissected at least 2 cm below the IMF to ensure its obliteration and a more masculine look on the patient.     At this time, we pulled inferiorly both upper mastectomy flaps and marked where they overlapped on the skin surface of the inferior pole of each breast.  This marking was approximately 3 cm cranially from the IMF.    We then proceeded to make incision on this second marking and continue dissection of the breast parenchyma between the subcutaneous tissue and the breast capsule until we reach the inframammary fold bilaterally.  This dissection developed the inferior mastectomy flap.  At this time, the breast mound was completely excised from the underlying pectoralis major muscle fascia as a mastectomy and sent to pathology as a specimens.  The right breast weight 642 grams and the left breast weight 730 grams.      After this first resection, our incisions were temporarily closed with skin staples.  The patient was put into a sitting position.  This allowed us to make further adjustment with regard to the level of our incisions as well as the contour and shape of the chest wall.  We then resected all the markings and the extra tissue that wound gain us better symmetry.  When we were satisfied with the contour, we then irrigated the mastectomy pockets and achieved excellent hemostasis with electrocautery.     A #15 John drains were then introduced through separate stab incisions laterally and secured with 2-0 silk suture. The mastectomy wounds were then closed in layers with 3-0 Monocryl deep dermal buried interrupted stitches, followed by 4-0 V-Loc running subcuticular stitches.       Bacitracin ointment and Xeroform extra gauze was used to seal the incisions.  The John drains were trimmed and put to  bulb suction.  Dressings of Kerlix rolls were unfurled across the anterior chest for padding before wrapping the chest circumferentially with a double long 8 inch Ace wrap for compression.  Instrument count was reported by nursing personnel as correct, patient tolerated procedure well and was extubated and transferred to recovery room awake in stable condition.    Nehemiah Mcgee MD , FACS   Diplomate American Board of Plastic Surgery  Diplomate American Board of Surgery  Adj. Assistant Professor of Surgery  Division of Plastic & Reconstructive Surgery   [facility]  Office: [phone]   5/14/2024 at 11:46 AM

## 2024-05-14 NOTE — DISCHARGE INSTRUCTIONS
"[facility]  Home Care Following Anesthesia  For 24 hours after surgery:  Get plenty of rest.  A responsible adult must stay with you for at least 24 hours after you leave the surgery center.  Do not drive or use heavy equipment.  If you have weakness or tingling, don't drive or use heavy equipment until this feeling goes away.   Do not drink alcohol.   Avoid strenuous or risky activities.  Ask for help when climbing stairs.  You may feel lightheaded.  IF so, sit for a few minutes before standing.  Have someone help you get up.   If you have nausea (feel sick to your stomach): Drink only clear liquids such as apple juice, ginger ale, broth or 7-Up.  Rest may also help.  Be sure to drink enough fluids.  Move to a regular diet as you feel able.   You may have a slight fever.  Call the doctor if your fever is over 100 F (37.7 C) (taken under the tongue) or lasts longer than 24 hours.  You may have a dry mouth, a sore throat, muscle aches or trouble sleeping. These should go away after 24 hours.  Do not make important or legal decisions.   It is recommended to avoid smoking.   If you use hormonal birth control (such as the pill, patch, ring or implants):  You will need a second form of birth control for 7 days (condoms, a diaphragm or contraceptive foam).  While in the surgery center, you received a medicine called Sugammadex.  Hormonal birth control (such as the pill, patch, ring or implants) will not work as well for a week after taking this medicine.  Today you received a Marcaine or bupivacaine block to numb the nerves near your surgery site.  This is a block using local anesthetic or \"numbing\" medication injected around the nerves to anesthetize or \"numb\" the area supplied by those nerves.  This block is injected into the muscle layer near your surgical site.  The medication may numb the location where you had surgery for 6-18 hours, but may last up to 24 hours.  If your " surgical site is an arm or leg you should be careful with your affected limb, since it is possible to injure your limb without being aware of it due to the numbing.  Until full feeling returns, you should guard against bumping or hitting your limb, and avoid extreme hot or cold temperatures on the skin.  As the block wears off, the feeling will return as a tingling or prickly sensation near your surgical site.  You will experience more discomfort from your incision as the feeling returns.  You may want to take a pain pill (a narcotic or Tylenol if this was prescribed by your surgeon) when you start to experience mild pain before the pain beccomes more severe.  If your pain medications do not control your pain you should notifiy your surgeon.    Tips for taking pain medications  To get the best pain relief possible, remember these points:  Take pain medications as directed, before pain becomes severe.  Pain medication can upset your stomach: taking it with food may help.  Constipation is a common side effect of pain medication. Drink plenty of  fluids.  Eat foods high in fiber. Take a stool softener if recommended by your doctor or pharmacist.  Do not drink alcohol, drive or operate machinery while taking pain medications.  Ask about other ways to control pain, such as with heat, ice or relaxation.    Tylenol/Acetaminophen Consumption    If you feel your pain relief is insufficient, you may take Tylenol/Acetaminophen in addition to your narcotic pain medication.   Be careful not to exceed 4,000 mg of Tylenol/Acetaminophen in a 24 hour period from all sources.  If you are taking extra strength Tylenol/acetaminophen (500 mg), the maximum dose is 8 tablets in 24 hours.  If you are taking regular strength acetaminophen (325 mg), the maximum dose is 12 tablets in 24 hours.    Call a doctor for any of the following:  Signs of infection (fever, growing tenderness at the surgery site, a large amount of drainage or bleeding,  severe pain, foul-smelling drainage, redness, swelling).  It has been over 8 to 10 hours since surgery and you are still not able to urinate (pass water).  Headache for over 24 hours.  Numbness, tingling or weakness the day after surgery (if you had spinal anesthesia).  Signs of Covid-19 infection (temperature over 100 degrees, shortness of breath, cough, loss of taste/smell, generalized body aches, persistent headache, chills, sore throat, nausea/vomiting/diarrhea)  Your doctor is:       Dr. Nehemiah Mcgee, Plastic Surgery: [phone]               Or dial [phone] and ask for the resident on call for:  Plastics  For emergency care, call the:  [facility]:  [phone] (TTY for hearing impaired: [phone])

## 2024-05-14 NOTE — ANESTHESIA PROCEDURE NOTES
Airway       Patient location during procedure: OR  Staff -        CRNA: Anusha Yousif APRN CRNA       Performed By: CRNA  Consent for Airway        Urgency: elective  Indications and Patient Condition       Indications for airway management: ashley-procedural       Induction type:intravenous       Mask difficulty assessment: 0 - not attempted    Final Airway Details       Final airway type: supraglottic airway    Supraglottic Airway Details        Type: LMA       Brand: I-Gel       LMA size: 4    Post intubation assessment        Placement verified by: capnometry and chest rise        Number of attempts at approach: 1 (placed by ROSA MARIA VELEZ)       Number of other approaches attempted: 0       Secured with: tape       Ease of procedure: easy       Dentition: Intact

## 2024-05-14 NOTE — ANESTHESIA POSTPROCEDURE EVALUATION
Patient: Monse Castillo    Procedure: Procedure(s):  MASTECTOMY, SIMPLE BILATERAL       Anesthesia Type:  General    Note:  Disposition: Outpatient   Postop Pain Control: Uneventful            Sign Out: Well controlled pain   PONV: No   Neuro/Psych: Uneventful            Sign Out: Acceptable/Baseline neuro status   Airway/Respiratory: Uneventful            Sign Out: Acceptable/Baseline resp. status   CV/Hemodynamics: Uneventful            Sign Out: Acceptable CV status; No obvious hypovolemia; No obvious fluid overload   Other NRE:    DID A NON-ROUTINE EVENT OCCUR?            Last vitals:  Vitals Value Taken Time   /72 05/14/24 1145   Temp 36.7  C (98.1  F) 05/14/24 1145   Pulse 109 05/14/24 1158   Resp 13 05/14/24 1158   SpO2 99 % 05/14/24 1158   Vitals shown include unfiled device data.    Electronically Signed By: Nicanor Mcdowell MD  May 14, 2024  11:59 AM

## 2024-05-14 NOTE — ANESTHESIA CARE TRANSFER NOTE
Patient: Monse Castillo    Procedure: Procedure(s):  MASTECTOMY, SIMPLE BILATERAL       Diagnosis: Gender dysphoria [F64.9]  Diagnosis Additional Information: No value filed.    Anesthesia Type:   General     Note:    Oropharynx: oropharynx clear of all foreign objects and spontaneously breathing  Level of Consciousness: drowsy  Oxygen Supplementation: face mask  Level of Supplemental Oxygen (L/min / FiO2): 6  Independent Airway: airway patency satisfactory and stable  Dentition: dentition unchanged  Vital Signs Stable: post-procedure vital signs reviewed and stable  Report to RN Given: handoff report given  Patient transferred to: PACU    Handoff Report: Identifed the Patient, Identified the Reponsible Provider, Reviewed the pertinent medical history, Discussed the surgical course, Reviewed Intra-OP anesthesia mangement and issues during anesthesia, Set expectations for post-procedure period and Allowed opportunity for questions and acknowledgement of understanding    Vitals:  Vitals Value Taken Time   /72 05/14/24 1125   Temp     Pulse 90 05/14/24 1127   Resp 18 05/14/24 1127   SpO2 100 % 05/14/24 1127   Vitals shown include unfiled device data.    Electronically Signed By: ULISES Weber CRNA  May 14, 2024  11:28 AM

## 2024-05-14 NOTE — ANESTHESIA POSTPROCEDURE EVALUATION
Patient: Monse Castillo    Procedure: Procedure(s):  MASTECTOMY, SIMPLE BILATERAL       Anesthesia Type:  General    Note:  Disposition: Outpatient   Postop Pain Control: Uneventful            Sign Out: Well controlled pain   PONV: No   Neuro/Psych: Uneventful            Sign Out: Acceptable/Baseline neuro status   Airway/Respiratory: Uneventful            Sign Out: Acceptable/Baseline resp. status   CV/Hemodynamics: Uneventful            Sign Out: Acceptable CV status; No obvious hypovolemia; No obvious fluid overload   Other NRE:    DID A NON-ROUTINE EVENT OCCUR?            Last vitals:  Vitals Value Taken Time   /72 05/14/24 1145   Temp 36.7  C (98.1  F) 05/14/24 1145   Pulse 85 05/14/24 1157   Resp 21 05/14/24 1157   SpO2 99 % 05/14/24 1157   Vitals shown include unfiled device data.    Electronically Signed By: Nicanor Mcdowell MD  May 14, 2024  11:58 AM

## 2024-05-20 ENCOUNTER — OFFICE VISIT (OUTPATIENT)
Dept: PLASTIC SURGERY | Facility: CLINIC | Age: 24
End: 2024-05-20
Payer: COMMERCIAL

## 2024-05-20 VITALS
SYSTOLIC BLOOD PRESSURE: 123 MMHG | BODY MASS INDEX: 23.04 KG/M2 | HEIGHT: 63 IN | TEMPERATURE: 98.9 F | DIASTOLIC BLOOD PRESSURE: 85 MMHG | OXYGEN SATURATION: 99 % | WEIGHT: 130 LBS | HEART RATE: 119 BPM

## 2024-05-20 DIAGNOSIS — Z90.13 STATUS POST MASTECTOMY, BILATERAL: Primary | ICD-10-CM

## 2024-05-20 PROCEDURE — 99024 POSTOP FOLLOW-UP VISIT: CPT | Performed by: NURSE PRACTITIONER

## 2024-05-20 ASSESSMENT — PAIN SCALES - GENERAL: PAINLEVEL: NO PAIN (0)

## 2024-05-20 NOTE — NURSING NOTE
"Chief Complaint   Patient presents with    BRITTANY Mitchell, is being seen today for a S/P Mastectomy 5/14/24 with Dr. Mcgee.       Vitals:    05/20/24 0947   BP: 123/85   BP Location: Left arm   Patient Position: Chair   Cuff Size: Adult Regular   Pulse: 119   Temp: 98.9  F (37.2  C)   TempSrc: Oral   SpO2: 99%   Weight: 59 kg (130 lb)   Height: 1.6 m (5' 2.99\")       Body mass index is 23.04 kg/m .      Paty Ortiz LPN    "

## 2024-05-20 NOTE — PATIENT INSTRUCTIONS
Gael Mitchell,    It was greats to see you at your first post-op appointment today! As a reminder here are the post-op instructions we discussed, please read through them and reach out should you have any questions or concerns.     You can take a shower today! Please make sure someone is home with you during your first shower in case you feel weak or lightheaded. You can remove all dressings prior to showering.   If you have nipple grafts please do not let the shower water hit you from the front for the next 7 days. Please shower with your back to the stream of water. It is okay for the grafts to get wet, but we do not want direct water pressure to the grafts as it can detach them if too strong.   If you DO NOT have nipple grafts, please shower as you normally would.     After your shower you will need to pat the surgical area dry and apply antibiotic ointment to the surgical site, nipple grafts (if you have them) and drain sites. The drain sites will also need a piece of gauze and tape to hold the gauze in place for the next 3 days because they can ooze after they have been removed and while they are healing. Please continue to use antibiotic ointment for an additional 4 days. Once the week of antibiotic ointment is up, you will switch to cocoa butter lotion.     Cocoa butter lotion will need to be massaged around the entire surgical site 3 times per day for the next 3-6 months. This will help with scarring and diminish the appearance of your suture lines. It is very important to massage this lotion in when you are applying it as the massage aspect is key to the improvement of scar appearance.     You are now able to wear Deoderant, please note it can be tender under and around your armpit area. Please apply gently.     You will need to wear a compression wrap 24/7 unless showering until cleared by Dr. Mcgee. We like the wrap from the company [employer] in the style [id number]. It can be purchased using the  URL: https://[employer]/product/fs-406g-male-body-[email or web address]     As a reminder you have a 10 lbs weight restriction until you hit 6 weeks post. So even though you may be feeling much better, you need to follow the post-op restrictions to prevent any complications. We would like to remind you not to lift, push, pull or carry anything greater than 10lbs or reach overhead during the healing period. Once you are at the 6 week post-op you can return to normal activities.     Again, we are here should you need anything! Do not hesitate to reach out! Wishing you a continued speedy recovery! We look forward to seeing you at your next scheduled post-op visit.     Katty Wu APRN CNP on 5/20/2024 at 10:00 AM

## 2024-05-20 NOTE — PROGRESS NOTES
Subjective:  Stephen is a 24 year old patient status post gender affirming bilateral double incision mastectomy with no nipple grafting with Dr Mcgee on 5/14/24. They are 6 days out from surgery and feeling well. Drain outputs have been <20ml per side for the past 3-4 days. Pain has been minimal and no longer taking pain meds.    Objective:  General: NAD  Chest: Bilateral incisions c/d/I. No wounds or drainage. Incisions meet midline of chest. No nipple grafts. Dressings were removed, bilateral eliana drains have been removed. Good symmetry on bilateral incision mastectomies. No evidence of infection or dehiscence or late seroma or hematoma.     Assessment/Plan:   S/P bilateral mastectomy  Healing well  Reviewed 10lb weight restriction and recommended wrapping until 6 weeks PO  Begin applying compression garment and cococa butter lotion over the incision. Avoid heavy lifting for the next 5 weeks and return in 2-3 weeks for your next post-op visit.     ULISES Bains CNP on 5/20/2024 at 9:58 AM

## 2024-05-20 NOTE — LETTER
5/20/2024       RE: Monse Castillo  [address]  [place] [facility] [address]     Dear Colleague,    Thank you for referring your patient, Monse Castillo, to the [facility] [place] at [facility]. Please see a copy of my visit note below.    Subjective:  Stephen is a 24 year old patient status post gender affirming bilateral double incision mastectomy with no nipple grafting with Dr Mcgee on 5/14/24. They are 6 days out from surgery and feeling well. Drain outputs have been <20ml per side for the past 3-4 days. Pain has been minimal and no longer taking pain meds.    Objective:  General: NAD  Chest: Bilateral incisions c/d/I. No wounds or drainage. Incisions meet midline of chest. No nipple grafts. Dressings were removed, bilateral eliana drains have been removed. Good symmetry on bilateral incision mastectomies. No evidence of infection or dehiscence or late seroma or hematoma.     Assessment/Plan:   S/P bilateral mastectomy  Healing well  Reviewed 10lb weight restriction and recommended wrapping until 6 weeks PO  Begin applying compression garment and cococa butter lotion over the incision. Avoid heavy lifting for the next 5 weeks and return in 2-3 weeks for your next post-op visit.       Again, thank you for allowing me to participate in the care of your patient.      Sincerely,    ULISES Bains CNP

## 2024-05-23 LAB
PATH REPORT.COMMENTS IMP SPEC: NORMAL
PATH REPORT.COMMENTS IMP SPEC: NORMAL
PATH REPORT.FINAL DX SPEC: NORMAL
PATH REPORT.GROSS SPEC: NORMAL
PATH REPORT.MICROSCOPIC SPEC OTHER STN: NORMAL
PATH REPORT.RELEVANT HX SPEC: NORMAL
PHOTO IMAGE: NORMAL

## 2024-06-21 ENCOUNTER — OFFICE VISIT (OUTPATIENT)
Dept: PLASTIC SURGERY | Facility: AMBULATORY SURGERY CENTER | Age: 24
End: 2024-06-21
Payer: COMMERCIAL

## 2024-06-21 DIAGNOSIS — Z90.13 STATUS POST BILATERAL MASTECTOMY: Primary | ICD-10-CM

## 2024-06-21 PROCEDURE — 99024 POSTOP FOLLOW-UP VISIT: CPT | Performed by: PLASTIC SURGERY

## 2024-06-21 NOTE — LETTER
6/21/2024      Monse Castillo  [address]  [place] [facility] [address]      Dear Colleague,    Thank you for referring your patient, Monse Castillo, to the [facility] [place]. Please see a copy of my visit note below.    Stephen is a 24 years old patient status post bilateral mastectomies with no nipple grafting.  Patient is almost 6 weeks out from surgery.  Patient is doing well.    At the physical exam, all incisions are well-healed.  No sign of hypertrophic scarring or keloid.  Good shape and definition bilaterally.                Plan: Patient is happy with results and so am I.  Follow-up as needed.  Patient knows that I am leaving this practice.    Nehemiah Mcgee MD , FACS   Diplomate American Board of Plastic Surgery  Diplomate American Board of Surgery  Adj. Assistant Professor of Surgery  Division of Plastic & Reconstructive Surgery   [facility]  Office: [phone]   6/21/2024 at 9:39 AM       Again, thank you for allowing me to participate in the care of your patient.        Sincerely,        Nehemiah Mcgee MD

## 2024-06-21 NOTE — PROGRESS NOTES
Stephen is a 24 years old patient status post bilateral mastectomies with no nipple grafting.  Patient is almost 6 weeks out from surgery.  Patient is doing well.    At the physical exam, all incisions are well-healed.  No sign of hypertrophic scarring or keloid.  Good shape and definition bilaterally.                Plan: Patient is happy with results and so am I.  Follow-up as needed.  Patient knows that I am leaving this practice.    Nehemiah Mcgee MD , FACS   Diplomate American Board of Plastic Surgery  Diplomate American Board of Surgery  Adj. Assistant Professor of Surgery  Division of Plastic & Reconstructive Surgery   [facility]  Office: [phone]   6/21/2024 at 9:39 AM

## 2024-12-14 ENCOUNTER — HEALTH MAINTENANCE LETTER (OUTPATIENT)
Age: 24
End: 2024-12-14

## 2025-02-04 ENCOUNTER — OFFICE VISIT (OUTPATIENT)
Dept: OBGYN | Facility: CLINIC | Age: 25
End: 2025-02-04
Attending: MIDWIFE
Payer: COMMERCIAL

## 2025-02-04 VITALS
HEART RATE: 98 BPM | HEIGHT: 63 IN | SYSTOLIC BLOOD PRESSURE: 122 MMHG | WEIGHT: 124.1 LBS | DIASTOLIC BLOOD PRESSURE: 77 MMHG | BODY MASS INDEX: 21.99 KG/M2

## 2025-02-04 DIAGNOSIS — Z12.4 SCREENING FOR MALIGNANT NEOPLASM OF CERVIX: ICD-10-CM

## 2025-02-04 DIAGNOSIS — N89.8 VAGINAL DISCHARGE: ICD-10-CM

## 2025-02-04 DIAGNOSIS — Z00.00 VISIT FOR PREVENTIVE HEALTH EXAMINATION: Primary | ICD-10-CM

## 2025-02-04 DIAGNOSIS — Z11.3 SCREENING EXAMINATION FOR VENEREAL DISEASE: ICD-10-CM

## 2025-02-04 DIAGNOSIS — Z01.419 ENCOUNTER FOR GYNECOLOGICAL EXAMINATION WITHOUT ABNORMAL FINDING: ICD-10-CM

## 2025-02-04 LAB
BACTERIAL VAGINOSIS VAG-IMP: NEGATIVE
CANDIDA DNA VAG QL NAA+PROBE: DETECTED
CANDIDA GLABRATA / CANDIDA KRUSEI DNA: NOT DETECTED
T VAGINALIS DNA VAG QL NAA+PROBE: NOT DETECTED

## 2025-02-04 PROCEDURE — G0463 HOSPITAL OUTPT CLINIC VISIT: HCPCS | Performed by: MIDWIFE

## 2025-02-04 PROCEDURE — 87591 N.GONORRHOEAE DNA AMP PROB: CPT | Performed by: MIDWIFE

## 2025-02-04 PROCEDURE — 87491 CHLMYD TRACH DNA AMP PROBE: CPT | Performed by: MIDWIFE

## 2025-02-04 PROCEDURE — 81515 NFCT DS BV&VAGINITIS DNA ALG: CPT | Performed by: MIDWIFE

## 2025-02-04 ASSESSMENT — ANXIETY QUESTIONNAIRES
2. NOT BEING ABLE TO STOP OR CONTROL WORRYING: MORE THAN HALF THE DAYS
7. FEELING AFRAID AS IF SOMETHING AWFUL MIGHT HAPPEN: NEARLY EVERY DAY
3. WORRYING TOO MUCH ABOUT DIFFERENT THINGS: NEARLY EVERY DAY
1. FEELING NERVOUS, ANXIOUS, OR ON EDGE: NEARLY EVERY DAY
GAD7 TOTAL SCORE: 17
5. BEING SO RESTLESS THAT IT IS HARD TO SIT STILL: MORE THAN HALF THE DAYS
GAD7 TOTAL SCORE: 17
6. BECOMING EASILY ANNOYED OR IRRITABLE: MORE THAN HALF THE DAYS

## 2025-02-04 ASSESSMENT — PATIENT HEALTH QUESTIONNAIRE - PHQ9
5. POOR APPETITE OR OVEREATING: MORE THAN HALF THE DAYS
SUM OF ALL RESPONSES TO PHQ QUESTIONS 1-9: 15

## 2025-02-04 NOTE — PATIENT INSTRUCTIONS
Thank you for trusting us with your care!   Please be aware, if you are on Mychart, you may see your results prior to your providers review. If labs are abnormal, we will call or message you on Mychart with a follow up plan.    If you need to contact us for questions about:  Symptoms, Scheduling & Medical Questions; Non-urgent (2-3 day response) Mychart message, Urgent (needing response today) [phone] (if after 3:30pm next day response)   Prescriptions: Please call your Pharmacy   Billing: [facility] [phone] or [facility]:[phone]    PREVENTIVE HEALTH RECOMMENDATIONS:   Most women need a yearly breast and pelvic exam.    A PAP screen, a test done DURING a pelvic exam, is NO longer recommended yearly.    March 2013, screening guidelines recommended by ACOG for PAP screen are:    1) First pap at age 21.    2) Pap every 3 years until age 30.    3) After age 30, pap every 3 years or Pap with HR HPV screen every 5 years until age 65.  4) Women do NOT need a vaginal Pap screen after a hysterectomy (surgical removal of the uterus) when they have not had cancer.    Exceptions:  1) Yearly pap if HIV+ or immunosuppressed secondary to organ transplant  2) JUSTINE II-III continue routine screening for 20 years.    I encourage you continue looking for opportunities to choose a healthy lifestyle:       * Choose to eat a heart healthy diet. Check out the FOOD PLATE guidelines at: http://www.choosemyplate.gov/ for helpful hints on weight and cholesterol management.  Balance your caloric intake with exercise to maintain a BMI in the 22 to 26 range. For bone health: Eat calcium-rich foods like yogurt, broccoli or take chewable calcium pills (500 to 600 mg) twice a day with food.       * Exercise for at least an average of 30 minutes a day, 5 days of the week. This will help you control your weight, release stress, and help prevent disease.      * Take a Vitamin D3 supplement daily fall through spring and during summer  unless you [id number]' full body sun exposure to skin without sunscreen.      * DO wear sunscreen to prevent skin cancer after the first 15-30 minutes.      * Identify stressors in your life, find ways to release the stress, and, make time for yourself. PLEASE ask for help if mood changes last longer than two weeks.     * Limit alcohol to one drink per day.  No smoking.  Avoid second hand smoke. If you smoke, ask for help to stop.       *  If you are in a sexual relationship, talk with your partner about possible infection risks and take action to protect yourself from exposure to a sexual infection.    Please request an infection screen for STIs (sexually transmitted infections) if you are less than age 26 OR believe that you may be at risk.     Get a flu shot each year. Get a tetanus shot every 10 years. EVERYONE needs a pertussis (Whooping cough) booster.    See your dentist twice a year for an exam and preventive care cleaning.     Consider the following screen tests:    1) cholesterol test every 5 years.     2) yearly mammogram after age 40 unless you have identified risks.    3) colonoscopy every 10 years after age 50 unless you have identified risks.    4) diabetes blood test screening if you are at risk for diabetes.      Additional information that you may also find helpful:  The Internet now gives us access to LOTS of information -- some of it helpful, research documented and also plenty of harmful, anecdotal information that may not pertain to your situtaion. Consider visiting the following websites for accurate health information:    www.vitamindcouncil.org/ : Info and ongoing research re Vitamin D    www.fairview.org : Up to date and easily searchable information on multiple topics.    www.medlineplus.gov : medication info, interactive tutorials, watch real surgeries online    www.cdc.gov : public health info, travel advisories, epidemics (H1N1)    www.giles/std.org: current research re diagnosis,  treatment and prevention of sexually contacted infections.    www.health.[facility].[place].us : [place] dept of heat, public health issues in [place], N1N1    www.familydoctor.org : good info from the Academy of Family Physicians        PREVENTIVE HEALTH RECOMMENDATIONS:   Most women need a yearly breast and pelvic exam.    A PAP screen, a test done DURING a pelvic exam, is NO longer recommended yearly.    March 2013, screening guidelines recommended by ACOG for PAP screen are:    1) First pap at age 21.    2) Pap every 3 years until age 30.    3) After age 30, pap every 3 years or Pap with HR HPV screen every 5 years until age 65.  4) Women do NOT need a vaginal Pap screen after a hysterectomy (surgical removal of the uterus) when they have not had cancer.    Exceptions:  1) Yearly pap if HIV+ or immunosuppressed secondary to organ transplant  2) JUSTINE II-III continue routine screening for 20 years.    I encourage you continue looking for opportunities to choose a healthy lifestyle:       * Choose to eat a heart healthy diet. Check out the FOOD PLATE guidelines at: http://www.GOBAplate.gov/ for helpful hints on weight and cholesterol management.  Balance your caloric intake with exercise to maintain a BMI in the 22 to 26 range. For bone health: Eat calcium-rich foods like yogurt, broccoli or take chewable calcium pills (500 to 600 mg) twice a day with food.       * Exercise for at least an average of 30 minutes a day, 5 days of the week. This will help you control your weight, release stress, and help prevent disease.      * Take a Vitamin D3 supplement daily fall through spring and during summer unless you [id number]' full body sun exposure to skin without sunscreen.      * DO wear sunscreen to prevent skin cancer after the first 15-30 minutes.      * Identify stressors in your life, find ways to release the stress, and, make time for yourself. PLEASE ask for help if mood changes last longer than two weeks.     * Limit alcohol to  one drink per day.  No smoking.  Avoid second hand smoke. If you smoke, ask for help to stop.       *  If you are in a sexual relationship, talk with your partner about possible infection risks and take action to protect yourself from exposure to a sexual infection.    Please request an infection screen for STIs (sexually transmitted infections) if you are less than age 26 OR believe that you may be at risk.     Get a flu shot each year. Get a tetanus shot every 10 years. EVERYONE needs a pertussis (Whooping cough) booster.    See your dentist twice a year for an exam and preventive care cleaning.     Consider the following screen tests:    1) cholesterol test every 5 years.     2) yearly mammogram after age 40 unless you have identified risks.    3) colonoscopy every 10 years after age 50 unless you have identified risks.    4) diabetes blood test screening if you are at risk for diabetes.      Additional information that you may also find helpful:  The Internet now gives us access to LOTS of information -- some of it helpful, research documented and also plenty of harmful, anecdotal information that may not pertain to your situtaion. Consider visiting the following websites for accurate health information:    www.vitamindcouncil.org/ : Info and ongoing research re Vitamin D    www.fairview.org : Up to date and easily searchable information on multiple topics.    www.medlineplus.gov : medication info, interactive tutorials, watch real surgeries online    www.cdc.gov : public health info, travel advisories, epidemics (H1N1)    www.giles/std.org: current research re diagnosis, treatment and prevention of sexually contacted infections.    www.health.[facility].[place].us : [place] dept of heatl, public health issues in [place], N1N1    www.familydoctor.org : good info from the Academy of Family Physicians

## 2025-02-04 NOTE — PROGRESS NOTES
"[facility]   Progress Note    SUBJECTIVE:  Monse Castillo is an 24 year old, G0  who requests an Annual Preventive Exam.  With first pap.  Discussed screening people with cervix every 3 yrs until age 30 then q 5 > 30     Currently In school studying [employer] science  At [employer]     2 semesters left   Family in [place]    Exercise  walks   near [facility]   Concerns today include: here for first annual GYN exam  would like a pap screening today   Pt had gender affirming surgery bilateral mastectomy 5/2024  states is happy with result  recovery went well no issues   Does not have preferred pronouns at this time.     Hx anxiety, depression ADHD with occ intrusive suicidal thoughts  reports no intent or plan    Per chart  pt reports her current SNOW 7 and PHQ9 score are her baseline,  states lexapro has been the best med she has tried and feels stable on this   Taking concerta for ADHD   Not currently sexually active  past SI with penetration was not comfortable and does not feel interested in that.  Is agreeable to STI screen with GC/CHlamydia collection    Declines blood draw today for HIV/Syphilis, hep B C.   Declines lipid screen did not see one within last 5 years although her prior care would be out of town   Lives with a roommate and has supportive friends    Declines need for hormonal contraception     Menstrual History:      12/4/2022    12:45 AM 11/17/2023    11:05 AM 2/4/2025     3:00 PM   Menstrual History   LAST MENSTRUAL PERIOD 11/15/2022 11/1/2023 1/7/2025   LMP about a month ago   Regular  bleeds x 4 days  mod   Takes ibuprofen or naproxen     Last  No results found for: \"PAP\"  History of abnormal Pap smear:  first pap today      Mammogram current: not applicable  Last Mammogram:   No results found.     Last Colonoscopy:  No results found for this or any previous visit.      HISTORY:  Current Outpatient Medications   Medication Sig Dispense Refill    CONCERTA 18 MG CR tablet Take 18 mg by mouth daily      " escitalopram (LEXAPRO) 10 MG tablet Take 10 mg by mouth daily       No current facility-administered medications for this visit.     Allergies   Allergen Reactions    Grass Extracts [Gramineae Pollens]      Immunization History   Administered Date(s) Administered    COVID-19 12+ (MODERNA) 11/12/2024    COVID-19 MONOVALENT 12+ (Pfizer) 04/08/2021, 04/29/2021    COVID-19 Monovalent 18+ (Moderna) 01/06/2022    Comvax (HIB/HepB) 2002, 2002, 08/08/2003    DTAP (<7y) 2002, 2002, 2002, 08/08/2003, 04/11/2007    [id number] Novel Flu- Nasal 11/23/2009, 01/20/2010    HEPATITIS A (PEDS 12M-18Y) 11/30/2016, 05/30/2017    HPV9 11/30/2016, 05/30/2017    Influenza Vaccine >6 months,quad, PF 02/02/2017, 12/21/2017, 01/30/2018, 10/12/2018, 11/20/2019, 09/15/2023    Influenza, Split Virus, Trivalent, Pf (Fluzone\Fluarix) 11/12/2024    MMR 06/10/2003, 04/11/2007    Meningococcal ACWY (Menactra ) 06/09/2014, 10/12/2018    Pneumococcal (PCV 7) 2002, 2002, 06/10/2003, 08/08/2003    Polio, Unspecified 2002    Poliovirus, inactivated (IPV) 2002, 2002, 2002, 08/18/2003, 11/30/2016    TDAP (Adacel,Boostrix) 06/09/2014, 11/12/2024    Varicella 06/10/2003, 06/09/2014       OB History   No obstetric history on file.     Past Medical History:   Diagnosis Date    ADHD (attention deficit hyperactivity disorder)     dx age 5 yrs ago on Concerta    Anxiety     since HS  managed well    Depressive disorder     on Lexapro x 3 yrs and therapy     Past Surgical History:   Procedure Laterality Date    MASTECTOMY SIMPLE Bilateral 5/14/2024    Procedure: MASTECTOMY, SIMPLE BILATERAL;  Surgeon: Nehemiah Mcgee MD;  Location: [facility] OR     Family History   Problem Relation Age of Onset    Breast Cancer Mother         dx 65 age  lumpectomy and chemo in remission    Depression Mother     Depression Father     Diabetes Father         medications    Breast Cancer Maternal Grandmother         dx 90  "lumpectomy    Anxiety Disorder Maternal Grandmother     Depression Maternal Grandmother     Anesthesia Reaction Maternal Grandfather     Depression Paternal Grandfather      Social History     Socioeconomic History    Marital status: Single     Spouse name: None    Number of children: None    Years of education: None    Highest education level: None   Tobacco Use    Smoking status: Never    Smokeless tobacco: Never   Substance and Sexual Activity    Alcohol use: Yes    Drug use: Never    Sexual activity: Not Currently     Social Drivers of Health      Received from [employer]    Financial Resource Strain    Received from [employer] [employer]    Social Connections       ROS  [unfilled]       No data to display                   No data to display                  EXAM:  Blood pressure (!) 140/78, pulse 103, height 1.6 m (5' 3\"), weight 56.3 kg (124 lb 1.6 oz), last menstrual period 01/07/2025, not currently breastfeeding. Body mass index is 21.98 kg/m .  General - pleasant female in no acute distress.  Skin - no suspicious lesions or rashes  EENT-  PERRLA, euthyroid with out palpable nodules  Neck - supple without lymphadenopathy.  Lungs - clear to auscultation bilaterally.  Heart - regular rate and rhythm without murmur.  Abdomen - soft, nontender, nondistended, no masses or organomegaly noted.  Musculoskeletal - no gross deformities.  Neurological - normal strength, sensation, and mental status.    Breast Exam: NA       Pelvic Exam:  EG/BUS: Normal genital architecture without lesions, erythema or abnormal secretions Bartholin's, Urethra, [place]'s normal   Urethral meatus: normal   Urethra: no masses, tenderness, or scarring   Bladder: no masses or tenderness   Vagina: normal pink  with creamy, yellowish/white, and odorless  secretions  Cervix: Nulliparous,, no lesions, " and pink, moist, closed, without lesion or CMT  Uterus: anteverted,  and small, smooth, firm, mobile w/o pain  Adnexa: Within normal limits and No masses, nodularity, tenderness  Rectum:anus normal   (Z00.00) Visit for preventive health examination  (primary encounter diagnosis)  Plan: Multiplex Vaginal Panel by PCR          (Z12.4) Screening for malignant neoplasm of cervix  Plan: Pap Smear Exam [] Do Not Remove, Pelvic  exam          Procedure [], Pap Imaged         Thin Layer Screen Only - Recommended Age 21 -         24 Years          (Z01.419) Encounter for gynecological examination without abnormal finding    Plan: Pap Smear Exam [] Do Not Remove, Pelvic Exam Procedure [], Pap Imaged         Thin Layer Screen Only - Recommended Age 21 -         24 Years           (Z11.3) Screening examination for venereal disease  Plan: Chlamydia trachomatis PCR Swab, Neisseria         gonorrhoeae PCR Swab [[id number]]    (N89.8) Vaginal discharge  Plan: Multiplex Vaginal Panel by PCR          Return to clinic in one year.  Follow-up as needed.    Brenda Olmstead CNM APRN

## 2025-02-04 NOTE — LETTER
"2/4/2025       RE: Monse Castillo  [address]  [facility] [address]     Dear Colleague,    Thank you for referring your patient, Monse Castillo, to the [facility] [place] [facility]. Please see a copy of my visit note below.    [facility]   Progress Note    SUBJECTIVE:  Monse Castillo is an 24 year old, G0  who requests an Annual Preventive Exam.  With first pap.  Discussed screening people with cervix every 3 yrs until age 30 then q 5 > 30     Currently In school studying [employer] science  At [employer]     2 semesters left   Family in [place]    Exercise  walks   near [facility]   Concerns today include: here for first annual GYN exam  would like a pap screening today   Pt had gender affirming surgery bilateral mastectomy 5/2024  states is happy with result  recovery went well no issues   Does not have preferred pronouns at this time.     Hx anxiety, depression ADHD with occ intrusive suicidal thoughts  reports no intent or plan    Per chart  pt reports her current SNOW 7 and PHQ9 score are her baseline,  states lexapro has been the best med she has tried and feels stable on this   Taking concerta for ADHD   Not currently sexually active  past SI with penetration was not comfortable and does not feel interested in that.  Is agreeable to STI screen with GC/CHlamydia collection    Declines blood draw today for HIV/Syphilis, hep B C.   Declines lipid screen did not see one within last 5 years although her prior care would be out of town   Lives with a roommate and has supportive friends    Declines need for hormonal contraception     Menstrual History:      12/4/2022    12:45 AM 11/17/2023    11:05 AM 2/4/2025     3:00 PM   Menstrual History   LAST MENSTRUAL PERIOD 11/15/2022 11/1/2023 1/7/2025   LMP about a month ago   Regular  bleeds x 4 days  mod   Takes ibuprofen or naproxen     Last  No results found for: \"PAP\"  History of abnormal " Pap smear:  first pap today      Mammogram current: not applicable  Last Mammogram:   No results found.     Last Colonoscopy:  No results found for this or any previous visit.      HISTORY:  Current Outpatient Medications   Medication Sig Dispense Refill     CONCERTA 18 MG CR tablet Take 18 mg by mouth daily       escitalopram (LEXAPRO) 10 MG tablet Take 10 mg by mouth daily       No current facility-administered medications for this visit.     Allergies   Allergen Reactions     Grass Extracts [Gramineae Pollens]      Immunization History   Administered Date(s) Administered     COVID-19 12+ (MODERNA) 11/12/2024     COVID-19 MONOVALENT 12+ (Pfizer) 04/08/2021, 04/29/2021     COVID-19 Monovalent 18+ (Moderna) 01/06/2022     Comvax (HIB/HepB) 2002, 2002, 08/08/2003     DTAP (<7y) 2002, 2002, 2002, 08/08/2003, 04/11/2007     [id number] Novel Flu- Nasal 11/23/2009, 01/20/2010     HEPATITIS A (PEDS 12M-18Y) 11/30/2016, 05/30/2017     HPV9 11/30/2016, 05/30/2017     Influenza Vaccine >6 months,quad, PF 02/02/2017, 12/21/2017, 01/30/2018, 10/12/2018, 11/20/2019, 09/15/2023     Influenza, Split Virus, Trivalent, Pf (Fluzone\Fluarix) 11/12/2024     MMR 06/10/2003, 04/11/2007     Meningococcal ACWY (Menactra ) 06/09/2014, 10/12/2018     Pneumococcal (PCV 7) 2002, 2002, 06/10/2003, 08/08/2003     Polio, Unspecified 2002     Poliovirus, inactivated (IPV) 2002, 2002, 2002, 08/18/2003, 11/30/2016     TDAP (Adacel,Boostrix) 06/09/2014, 11/12/2024     Varicella 06/10/2003, 06/09/2014       OB History   No obstetric history on file.     Past Medical History:   Diagnosis Date     ADHD (attention deficit hyperactivity disorder)     dx age 5 yrs ago on Concerta     Anxiety     since HS  managed well     Depressive disorder     on Lexapro x 3 yrs and therapy     Past Surgical History:   Procedure Laterality Date     MASTECTOMY SIMPLE Bilateral 5/14/2024    Procedure:  "MASTECTOMY, SIMPLE BILATERAL;  Surgeon: Nehemiah Mcgee MD;  [place] [facility] [place]     Family History   Problem Relation Age of Onset     Breast Cancer Mother         dx 65 age  lumpectomy and chemo in remission     Depression Mother      Depression Father      Diabetes Father         medications     Breast Cancer Maternal Grandmother         dx 90 lumpectomy     Anxiety Disorder Maternal Grandmother      Depression Maternal Grandmother      Anesthesia Reaction Maternal Grandfather      Depression Paternal Grandfather      Social History     Socioeconomic History     Marital status: Single     Spouse name: None     Number of children: None     Years of education: None     Highest education level: None   Tobacco Use     Smoking status: Never     Smokeless tobacco: Never   Substance and Sexual Activity     Alcohol use: Yes     Drug use: Never     Sexual activity: Not Currently     Social Drivers of Health      Received from [employer]    Financial Resource Strain    Received from [employer]    Social Connections       ROS  [unfilled]       No data to display                   No data to display                  EXAM:  Blood pressure (!) 140/78, pulse 103, height 1.6 m (5' 3\"), weight 56.3 kg (124 lb 1.6 oz), last menstrual period 01/07/2025, not currently breastfeeding. Body mass index is 21.98 kg/m .  General - pleasant female in no acute distress.  Skin - no suspicious lesions or rashes  EENT-  PERRLA, euthyroid with out palpable nodules  Neck - supple without lymphadenopathy.  Lungs - clear to auscultation bilaterally.  Heart - regular rate and rhythm without murmur.  Abdomen - soft, nontender, nondistended, no masses or organomegaly noted.  Musculoskeletal - no gross deformities.  Neurological - normal strength, sensation, and mental status.    Breast Exam: NA "       Pelvic Exam:  EG/BUS: Normal genital architecture without lesions, erythema or abnormal secretions Bartholin's, Urethra, [place]'s normal   Urethral meatus: normal   Urethra: no masses, tenderness, or scarring   Bladder: no masses or tenderness   Vagina: normal pink  with creamy, yellowish/white, and odorless  secretions  Cervix: Nulliparous,, no lesions, and pink, moist, closed, without lesion or CMT  Uterus: anteverted,  and small, smooth, firm, mobile w/o pain  Adnexa: Within normal limits and No masses, nodularity, tenderness  Rectum:anus normal   (Z00.00) Visit for preventive health examination  (primary encounter diagnosis)  Plan: Multiplex Vaginal Panel by PCR          (Z12.4) Screening for malignant neoplasm of cervix  Plan: Pap Smear Exam [] Do Not Remove, Pelvic  exam          Procedure [], Pap Imaged         Thin Layer Screen Only - Recommended Age 21 -         24 Years          (Z01.419) Encounter for gynecological examination without abnormal finding    Plan: Pap Smear Exam [] Do Not Remove, Pelvic Exam Procedure [], Pap Imaged         Thin Layer Screen Only - Recommended Age 21 -         24 Years           (Z11.3) Screening examination for venereal disease  Plan: Chlamydia trachomatis PCR Swab, Neisseria         gonorrhoeae PCR Swab [[id number]]    (N89.8) Vaginal discharge  Plan: Multiplex Vaginal Panel by PCR          Return to clinic in one year.  Follow-up as needed.    Brenda MONTGOMERY        Again, thank you for allowing me to participate in the care of your patient.      Sincerely,    ULISES Luu CNM

## 2025-02-05 DIAGNOSIS — B37.31 YEAST INFECTION OF THE VAGINA: Primary | ICD-10-CM

## 2025-02-05 LAB
C TRACH DNA SPEC QL NAA+PROBE: NEGATIVE
N GONORRHOEA DNA SPEC QL NAA+PROBE: NEGATIVE
SPECIMEN TYPE: NORMAL
SPECIMEN TYPE: NORMAL

## 2025-02-05 RX ORDER — FLUCONAZOLE 150 MG/1
150 TABLET ORAL
Qty: 2 TABLET | Refills: 1 | Status: SHIPPED | OUTPATIENT
Start: 2025-02-05 | End: 2025-02-12

## (undated) DEVICE — DRAIN JACKSON PRATT ROUND W/TROCAR 15FR LF JP-HUR151

## (undated) DEVICE — SOL NACL 0.9% IRRIG 500ML BOTTLE 2F7123

## (undated) DEVICE — LINEN TOWEL PACK X5 5464

## (undated) DEVICE — BLADE KNIFE SURG 15 371115

## (undated) DEVICE — SOL RINGERS LACTATED 1000ML BAG 2B2324X

## (undated) DEVICE — UNIVERSAL DRAPE PACK 29118

## (undated) DEVICE — GLOVE BIOGEL PI MICRO SZ 6.5 48565

## (undated) DEVICE — DRAPE IOBAN INCISE 23X17" 6650EZ

## (undated) DEVICE — CUP AND LID 2PK 2OZ STERILE  SSK9006A

## (undated) DEVICE — DRAIN JACKSON PRATT RESERVOIR 100ML SU130-1305

## (undated) DEVICE — GLOVE BIOGEL PI MICRO INDICATOR UNDERGLOVE SZ 7.0 48970

## (undated) DEVICE — GLOVE BIOGEL PI MICRO INDICATOR UNDERGLOVE SZ 8.0 48980

## (undated) DEVICE — SPONGE LAP 18X18" X8435

## (undated) DEVICE — PEN MARKING SKIN W/PAPER RULER 31145785

## (undated) DEVICE — GOWN LG DISP 9515

## (undated) DEVICE — PACK MINOR CUSTOM ASC

## (undated) DEVICE — ESU CLEANER TIP 31142717

## (undated) DEVICE — DRSG XEROFORM 5X9" 8884431605

## (undated) DEVICE — KLEIN INFILTRATION SET SINGLE SPIKE ITS-10

## (undated) DEVICE — GLOVE GAMMEX NEOPRENE ULTRA SZ 7.5 LF 8515

## (undated) DEVICE — PAD CHUX UNDERPAD 30X36" P3036C

## (undated) DEVICE — BNDG ELASTIC 6" DBL LENGTH UNSTERILE 6611-16

## (undated) DEVICE — BNDG ELASTIC 6"X5YDS UNSTERILE 6611-60

## (undated) DEVICE — ESU GROUND PAD ADULT W/CORD E7507

## (undated) DEVICE — SUCTION TIP YANKAUER W/O VENT K86

## (undated) DEVICE — STRAP KNEE/BODY 31143004

## (undated) DEVICE — SU SILK 2-0 SH 30" K833H

## (undated) DEVICE — SUCTION MANIFOLD NEPTUNE 2 SYS 1 PORT 702-025-000

## (undated) DEVICE — DRSG ABDOMINAL 07 1/2X8" 7197D

## (undated) DEVICE — SU MONOCRYL 4-0 PS-2 27" UND Y426H

## (undated) DEVICE — CATH TRAY FOLEY SURESTEP 16FR W/DRAIN BAG LF A300416A

## (undated) DEVICE — PREP CHLORAPREP 26ML TINTED HI-LITE ORANGE 930815

## (undated) DEVICE — SU MONOCRYL 3-0 SH 27" UND Y416H

## (undated) DEVICE — SU WND CLOSURE VLOC 90 ABS 4-0 18" P-12 VLOCM0023

## (undated) DEVICE — DRSG KERLIX 4 1/2"X4YDS ROLL 6730

## (undated) DEVICE — ESU PENCIL SMOKE EVAC W/ROCKER SWITCH 0703-047-000

## (undated) DEVICE — ESU ELEC BLADE HEX-LOCKING 2.5" E1450X

## (undated) DEVICE — OINTMENT ANTIBIOTIC BACITRACIN ZINC .9 G 1171

## (undated) DEVICE — STPL SKIN 35W ROTATING HEAD PRW35

## (undated) RX ORDER — DEXMEDETOMIDINE HYDROCHLORIDE 4 UG/ML
INJECTION, SOLUTION INTRAVENOUS
Status: DISPENSED
Start: 2024-05-14

## (undated) RX ORDER — CEFAZOLIN SODIUM 2 G/50ML
SOLUTION INTRAVENOUS
Status: DISPENSED
Start: 2024-05-14

## (undated) RX ORDER — ONDANSETRON 2 MG/ML
INJECTION INTRAMUSCULAR; INTRAVENOUS
Status: DISPENSED
Start: 2024-05-14

## (undated) RX ORDER — EPINEPHRINE 1 MG/ML
INJECTION, SOLUTION INTRAMUSCULAR; SUBCUTANEOUS
Status: DISPENSED
Start: 2024-05-14

## (undated) RX ORDER — PROPOFOL 10 MG/ML
INJECTION, EMULSION INTRAVENOUS
Status: DISPENSED
Start: 2024-05-14

## (undated) RX ORDER — TRANEXAMIC ACID 100 MG/ML
INJECTION, SOLUTION INTRAVENOUS
Status: DISPENSED
Start: 2024-05-14

## (undated) RX ORDER — HYDROMORPHONE HYDROCHLORIDE 1 MG/ML
INJECTION, SOLUTION INTRAMUSCULAR; INTRAVENOUS; SUBCUTANEOUS
Status: DISPENSED
Start: 2024-05-14

## (undated) RX ORDER — DEXAMETHASONE SODIUM PHOSPHATE 4 MG/ML
INJECTION, SOLUTION INTRA-ARTICULAR; INTRALESIONAL; INTRAMUSCULAR; INTRAVENOUS; SOFT TISSUE
Status: DISPENSED
Start: 2024-05-14

## (undated) RX ORDER — CEFAZOLIN SODIUM 1 G/3ML
INJECTION, POWDER, FOR SOLUTION INTRAMUSCULAR; INTRAVENOUS
Status: DISPENSED
Start: 2024-05-14

## (undated) RX ORDER — FENTANYL CITRATE 50 UG/ML
INJECTION, SOLUTION INTRAMUSCULAR; INTRAVENOUS
Status: DISPENSED
Start: 2024-05-14

## (undated) RX ORDER — GLYCOPYRROLATE 0.2 MG/ML
INJECTION INTRAMUSCULAR; INTRAVENOUS
Status: DISPENSED
Start: 2024-05-14